# Patient Record
Sex: MALE | Race: WHITE | NOT HISPANIC OR LATINO | Employment: UNEMPLOYED | URBAN - METROPOLITAN AREA
[De-identification: names, ages, dates, MRNs, and addresses within clinical notes are randomized per-mention and may not be internally consistent; named-entity substitution may affect disease eponyms.]

---

## 2017-06-15 ENCOUNTER — OFFICE VISIT (OUTPATIENT)
Dept: FAMILY MEDICINE | Facility: CLINIC | Age: 52
End: 2017-06-15
Payer: COMMERCIAL

## 2017-06-15 VITALS
SYSTOLIC BLOOD PRESSURE: 136 MMHG | DIASTOLIC BLOOD PRESSURE: 82 MMHG | HEART RATE: 69 BPM | BODY MASS INDEX: 26.81 KG/M2 | RESPIRATION RATE: 14 BRPM | HEIGHT: 70 IN | WEIGHT: 187.25 LBS

## 2017-06-15 DIAGNOSIS — Z12.11 SCREENING FOR MALIGNANT NEOPLASM OF COLON: ICD-10-CM

## 2017-06-15 DIAGNOSIS — Z00.00 ROUTINE GENERAL MEDICAL EXAMINATION AT A HEALTH CARE FACILITY: Primary | ICD-10-CM

## 2017-06-15 DIAGNOSIS — Z12.5 SCREENING FOR PROSTATE CANCER: ICD-10-CM

## 2017-06-15 DIAGNOSIS — R03.0 ELEVATED BLOOD PRESSURE READING WITHOUT DIAGNOSIS OF HYPERTENSION: ICD-10-CM

## 2017-06-15 DIAGNOSIS — Z13.1 SCREENING FOR DIABETES MELLITUS: ICD-10-CM

## 2017-06-15 DIAGNOSIS — Z11.59 NEED FOR HEPATITIS C SCREENING TEST: ICD-10-CM

## 2017-06-15 LAB
GLUCOSE SERPL-MCNC: 148 MG/DL (ref 70–99)
PSA SERPL-ACNC: 0.55 UG/L (ref 0–4)

## 2017-06-15 PROCEDURE — G0103 PSA SCREENING: HCPCS | Performed by: FAMILY MEDICINE

## 2017-06-15 PROCEDURE — 36415 COLL VENOUS BLD VENIPUNCTURE: CPT | Performed by: FAMILY MEDICINE

## 2017-06-15 PROCEDURE — 82947 ASSAY GLUCOSE BLOOD QUANT: CPT | Performed by: FAMILY MEDICINE

## 2017-06-15 PROCEDURE — 86803 HEPATITIS C AB TEST: CPT | Performed by: FAMILY MEDICINE

## 2017-06-15 PROCEDURE — 99396 PREV VISIT EST AGE 40-64: CPT | Performed by: FAMILY MEDICINE

## 2017-06-15 NOTE — PATIENT INSTRUCTIONS
Go to [facility] now for your blood draw.  You will be contacted in 1-2 days for results of your lab tests.    Eat 5 cups of vegetables, fruits and whole grains per day.  Limit starchy food (white rice, white bread, white pasta, white potatoes) to less than a cup per meal.  Eat salmon, plain walnuts. Use olive oil for cooking or salad dressings.  Minimize sweets, junk food and fastfood.  Exercise: moderate intensity sustained for at least 30 mins per episode, goal of 150 mins per week at least    Blood pressure is iwthin range today but in prehypertensive range.  Monitor this closely.  If above 140/90 again, will need to start treatment.    Preventive Health Recommendations  Male Ages 50   64    Yearly exam:             See your health care provider every year in order to  o   Review health changes.   o   Discuss preventive care.    o   Review your medicines if your doctor has prescribed any.     Have a cholesterol test every 5 years, or more frequently if you are at risk for high cholesterol/heart disease.     Have a diabetes test (fasting glucose) every three years. If you are at risk for diabetes, you should have this test more often.     Have a colonoscopy at age 50, or have a yearly FIT test (stool test). These exams will check for colon cancer.      Talk with your health care provider about whether or not a prostate cancer screening test (PSA) is right for you.    You should be tested each year for STDs (sexually transmitted diseases), if you re at risk.     Shots: Get a flu shot each year. Get a tetanus shot every 10 years.     Nutrition:    Eat at least 5 servings of fruits and vegetables daily.     Eat whole-grain bread, whole-wheat pasta and brown rice instead of white grains and rice.     Talk to your provider about Calcium and Vitamin D.     Lifestyle    Exercise for at least 150 minutes a week (30 minutes a day, 5 days a week). This will help you control your weight and prevent disease.     Limit alcohol  to one drink per day.     No smoking.     Wear sunscreen to prevent skin cancer.     See your dentist every six months for an exam and cleaning.     See your eye doctor every 1 to 2 years.

## 2017-06-15 NOTE — LETTER
[facility]  [address] [address]  [phone]          Maggi 15, 2017    RE:  Osmin Yan                                                                                                                                                       [address] [address]            To whom it may concern:    Osmin Yan has been evaluated at the clinic today, Maggi 15, 2017.  His blood pressures today were measured to be 139/82 and 136/82 using appropriate sized upper arm cuff.  Based on this, he does not require antihypertensive medication.      Sincerely,        Mendoza Carter MD

## 2017-06-15 NOTE — NURSING NOTE
"Chief Complaint   Patient presents with     Physical       Initial /82  Pulse 69  Ht 5' 9.69\" (1.77 m)  Wt 187 lb 4 oz (84.9 kg)  BMI 27.11 kg/m2 Estimated body mass index is 27.11 kg/(m^2) as calculated from the following:    Height as of this encounter: 5' 9.69\" (1.77 m).    Weight as of this encounter: 187 lb 4 oz (84.9 kg).  Medication Reconciliation: complete   Hortencia Castrejon MA      "

## 2017-06-15 NOTE — PROGRESS NOTES
SUBJECTIVE:     CC: Osmin Yan is an 51 year old male who presents for preventative health visit.     Healthy Habits:    Do you get at least three servings of calcium containing foods daily (dairy, green leafy vegetables, etc.)? Yes 2-3    Amount of exercise or daily activities, outside of work: 2-4 hours per week , coaches daughters softball team, bike riding     Problems taking medications regularly not applicable    Medication side effects: not applicable     Have you had an eye exam in the past two years? Yes [facility] - around two years ago , wears glasses     Do you see a dentist twice per year? No- did have recent exam around two weeks ago     Do you have sleep apnea, excessive snoring or daytime drowsiness? Yes, excessive snoring , Mother has sleep apnea     * Discuss Blood pressure , was high at the dentist this past week 160/107 and 145/95 using a wrist BP machine twice, although did stop at [facility] and [facility] and blood pressure was 120/130- over 80/90. Denies chest pain, dyspnea, HA, BOV, dizziness or urinary changes. Not on BP meds. Patient's last BP in clinic WNL.     Was unable to have cavity's filled due to his blood pressure being high at the dentist     Today's PHQ-2 Score:   PHQ-2 ( 1999 [employer]) 6/15/2017 3/11/2014   Q1: Little interest or pleasure in doing things 0 0   Q2: Feeling down, depressed or hopeless 0 0   PHQ-2 Score 0 0       Abuse: Current or Past(Physical, Sexual or Emotional)- No  Do you feel safe in your environment - Yes    Social History   Substance Use Topics     Smoking status: Never Smoker     Smokeless tobacco: Never Used     Alcohol use No     The patient does not drink >3 drinks per day nor >7 drinks per week.    Last PSA:   PSA   Date Value Ref Range Status   03/12/2014 0.60 0 - 4 ug/L Final       Recent Labs   Lab Test  03/12/14   1018  03/01/12   0924   CHOL  151  121   HDL  35*  32*   LDL  86  67   TRIG  144  109   CHOLHDLRATIO  4.0  4.0       Reviewed  orders with patient. Reviewed health maintenance and updated orders accordingly - Yes    Reviewed and updated as needed this visit by clinical staff  Tobacco  Allergies  Meds  Problems  Med Hx  Surg Hx  Fam Hx  Soc Hx          Reviewed and updated as needed this visit by Provider  Allergies  Meds  Problems        Past Medical History:   Diagnosis Date     NO ACTIVE PROBLEMS       Past Surgical History:   Procedure Laterality Date     LAPAROSCOPIC CHOLECYSTECTOMY  3/21/2012    Procedure:LAPAROSCOPIC CHOLECYSTECTOMY; Laparoscopic Cholecystectomy; Surgeon:ROSALIE PEDROZA; Location:[facility]     SURGICAL HISTORY OF -   2005    none       ROS:  C: NEGATIVE for fever, chills, change in weight  I: NEGATIVE for worrisome rashes, moles or lesions  E: NEGATIVE for vision changes or irritation  ENT: NEGATIVE for ear, mouth and throat problems  R: NEGATIVE for significant cough or SOB  CV: NEGATIVE for chest pain, palpitations or peripheral edema  GI: NEGATIVE for nausea, abdominal pain, heartburn, or change in bowel habits   male: negative for dysuria, hematuria, decreased urinary stream, erectile dysfunction, urethral discharge  M: NEGATIVE for significant arthralgias or myalgia  N: NEGATIVE for weakness, dizziness or paresthesias  E: NEGATIVE for temperature intolerance, skin/hair changes  H: NEGATIVE for bleeding problems  P: NEGATIVE for changes in mood or affect    BP Readings from Last 3 Encounters:   06/15/17 136/82   05/24/14 123/82   03/11/14 140/85    Wt Readings from Last 3 Encounters:   06/15/17 187 lb 4 oz (84.9 kg)   03/11/14 185 lb 6.4 oz (84.1 kg)   03/21/12 176 lb (79.8 kg)                  Patient Active Problem List   Diagnosis     RUQ abdominal pain     Lipid disorder     Cholelithiasis     Calculus of gallbladder with other cholecystitis, without mention of obstruction     CARDIOVASCULAR SCREENING; LDL GOAL LESS THAN 160     HDL deficiency     Elevated ferritin     Past Surgical History:  "  Procedure Laterality Date     LAPAROSCOPIC CHOLECYSTECTOMY  3/21/2012    Procedure:LAPAROSCOPIC CHOLECYSTECTOMY; Laparoscopic Cholecystectomy; Surgeon:ROSALIE PEDROZA; Location:[facility] [facility]     SURGICAL HISTORY OF -   2005    none       Social History   Substance Use Topics     Smoking status: Never Smoker     Smokeless tobacco: Never Used     Alcohol use No     Family History   Problem Relation Age of Onset     Hypertension Mother      Sleep Apnea Mother      Lipids Father      Genitourinary Problems Father      prostate problems - getting biopsy to rule out cancer     Prostate Cancer Father      CEREBROVASCULAR DISEASE Maternal Grandfather      at late 50's yo     Alzheimer Disease Paternal Grandmother      at 70's yo     HEART DISEASE Paternal Grandfather      MI at 81 yo     Genitourinary Problems Paternal Grandfather      enlarged prostate on post-mortem autopsy     DIABETES Brother      type 1     Hypertension Maternal Aunt      Hypertension Maternal Uncle          No current outpatient prescriptions on file.     Allergies   Allergen Reactions     Nkda [No Known Drug Allergies]      Patient denies BM or urinary changes.  No fam hx of colon cancer before age 60.  No fam hx of prostate cancer.    OBJECTIVE:     /82  Pulse 69  Resp 14  Ht 5' 9.69\" (1.77 m)  Wt 187 lb 4 oz (84.9 kg)  BMI 27.11 kg/m2  EXAM:  GENERAL APPEARANCE: alert and no distress  EYES: pink conj, no icterus, PERRL, EOMI  HENT: ear canals and TM's normal, nose and mouth without ulcers or lesions, oropharynx clear and oral mucous membranes moist  NECK: no adenopathy, no asymmetry, masses, or scars and thyroid normal to palpation  RESP: lungs clear to auscultation - no rales, rhonchi or wheezes  CV: regular rates and rhythm, normal S1 S2, no S3 or S4, no murmur, click or rub, no peripheral edema and peripheral pulses strong  ABDOMEN: soft, nontender, no hepatosplenomegaly, no masses and bowel sounds normal  RECTAL:deferred  MS: no " musculoskeletal defects are noted and gait is age appropriate without ataxia  SKIN: no suspicious lesions or rashes  NEURO: Normal strength and tone, sensory exam grossly normal, mentation intact and speech normal    ASSESSMENT/PLAN:     Osmin was seen today for physical.    Diagnoses and all orders for this visit:    Routine general medical examination at a health care facility  Patient was advised on recommended screening and preventive health recommendations.  He verbalized understanding and agreed to the plans below.    Elevated blood pressure reading without diagnosis of hypertension  ADvised patient that BP is within goal but prehypertensive today.  No meds required.  Letter stating this issued for his dentist.    Need for hepatitis C screening test  -     **Hepatitis C Screen Reflex to RNA FUTURE anytime    Screening for prostate cancer  -     Prostate spec antigen screen    Screening for diabetes mellitus  -     GLUCOSE    Screening for malignant neoplasm of colon  -     GASTROENTEROLOGY ADULT REF PROCEDURE ONLY        COUNSELING:  Reviewed preventive health counseling, as reflected in patient instructions       Regular exercise       Healthy diet/nutrition       Vision screening       Hearing screening       Safe sex practices/STD prevention       Consider Hep C screening for patients born between 1945 and 1965       Colon cancer screening       Prostate cancer screening       Osteoporosis Prevention/Bone Health       The ASCVD Risk score (Bailey AMIRA Jr, et al., 2013) failed to calculate for the following reasons:    Cannot find a previous HDL lab    Cannot find a previous total cholesterol lab    BP Screening:   Last 3 BP Readings:    BP Readings from Last 3 Encounters:   06/15/17 136/82   05/24/14 123/82   03/11/14 140/85       The following was recommended to the patient:  Re-screen BP within a year and recommended lifestyle modifications     reports that he has never smoked. He has never used smokeless  "tobacco.    Estimated body mass index is 27.11 kg/(m^2) as calculated from the following:    Height as of this encounter: 5' 9.69\" (1.77 m).    Weight as of this encounter: 187 lb 4 oz (84.9 kg).   Weight management plan: Discussed healthy diet and exercise guidelines and patient will follow up in 12 months in clinic to re-evaluate.    Counseling Resources:  ATP IV Guidelines  Pooled Cohorts Equation Calculator  FRAX Risk Assessment  ICSI Preventive Guidelines  Dietary Guidelines for Americans, 2010  USDA's MyPlate  ASA Prophylaxis  Lung CA Screening    Mendoza Carter MD  [facility]  "

## 2017-06-15 NOTE — MR AVS SNAPSHOT
After Visit Summary   6/15/2017    Osmin Yan    MRN: [id number]           Patient Information     Date Of Birth          1965        Visit Information        Provider Department      6/15/2017 9:20 AM Mendoza Carter MD [facility]        Today's Diagnoses     Routine general medical examination at a health care facility    -  1    Elevated blood pressure reading without diagnosis of hypertension        Need for hepatitis C screening test        Screening for prostate cancer        Screening for diabetes mellitus          Care Instructions    Go to [facility] now for your blood draw.  You will be contacted in 1-2 days for results of your lab tests.    Eat 5 cups of vegetables, fruits and whole grains per day.  Limit starchy food (white rice, white bread, white pasta, white potatoes) to less than a cup per meal.  Eat salmon, plain walnuts. Use olive oil for cooking or salad dressings.  Minimize sweets, junk food and fastfood.  Exercise: moderate intensity sustained for at least 30 mins per episode, goal of 150 mins per week at least    Blood pressure is iwthin range today but in prehypertensive range.  Monitor this closely.  If above 140/90 again, will need to start treatment.    Preventive Health Recommendations  Male Ages 50 - 64    Yearly exam:             See your health care provider every year in order to  o   Review health changes.   o   Discuss preventive care.    o   Review your medicines if your doctor has prescribed any.     Have a cholesterol test every 5 years, or more frequently if you are at risk for high cholesterol/heart disease.     Have a diabetes test (fasting glucose) every three years. If you are at risk for diabetes, you should have this test more often.     Have a colonoscopy at age 50, or have a yearly FIT test (stool test). These exams will check for colon cancer.      Talk with your health care provider about whether or not a prostate cancer  "screening test (PSA) is right for you.    You should be tested each year for STDs (sexually transmitted diseases), if you re at risk.     Shots: Get a flu shot each year. Get a tetanus shot every 10 years.     Nutrition:    Eat at least 5 servings of fruits and vegetables daily.     Eat whole-grain bread, whole-wheat pasta and brown rice instead of white grains and rice.     Talk to your provider about Calcium and Vitamin D.     Lifestyle    Exercise for at least 150 minutes a week (30 minutes a day, 5 days a week). This will help you control your weight and prevent disease.     Limit alcohol to one drink per day.     No smoking.     Wear sunscreen to prevent skin cancer.     See your dentist every six months for an exam and cleaning.     See your eye doctor every 1 to 2 years.            Follow-ups after your visit        Follow-up notes from your care team     Return in about 1 year (around 6/15/2018), or if symptoms worsen or fail to improve.      Who to contact     If you have questions or need follow up information about today's clinic visit or your schedule please contact [facility] directly at [phone].  Normal or non-critical lab and imaging results will be communicated to you by MyChart, letter or phone within 4 business days after the clinic has received the results. If you do not hear from us within 7 days, please contact the clinic through [employer] or phone. If you have a critical or abnormal lab result, we will notify you by phone as soon as possible.  Submit refill requests through [employer] or call your pharmacy and they will forward the refill request to us. Please allow 3 business days for your refill to be completed.          Additional Information About Your Visit        revoPThart Information     [employer] lets you send messages to your doctor, view your test results, renew your prescriptions, schedule appointments and more. To sign up, go to [email or web address] . Click on \"Log in\" " "on the left side of the screen, which will take you to the Welcome page. Then click on \"Sign up Now\" on the right side of the page.     You will be asked to enter the access code listed below, as well as some personal information. Please follow the directions to create your username and password.     Your access code is: [email or web address]  Expires: 2017 10:14 AM     Your access code will  in 90 days. If you need help or a new code, please call your [facility] or [phone].        Care EveryWhere ID     This is your Care EveryWhere ID. This could be used by other organizations to access your [facility] medical records  [id number]        Your Vitals Were     Pulse Respirations Height BMI (Body Mass Index)          69 14 5' 9.69\" (1.77 m) 27.11 kg/m2         Blood Pressure from Last 3 Encounters:   06/15/17 136/82   14 123/82   14 140/85    Weight from Last 3 Encounters:   06/15/17 187 lb 4 oz (84.9 kg)   14 185 lb 6.4 oz (84.1 kg)   12 176 lb (79.8 kg)              We Performed the Following     **Hepatitis C Screen Reflex to RNA FUTURE anytime     GLUCOSE     Prostate spec antigen screen        Primary Care Provider    Physician No Ref-Primary       No address on file        Thank you!     Thank you for choosing [facility]  for your care. Our goal is always to provide you with excellent care. Hearing back from our patients is one way we can continue to improve our services. Please take a few minutes to complete the written survey that you may receive in the mail after your visit with us. Thank you!             Your Updated Medication List - Protect others around you: Learn how to safely use, store and throw away your medicines at www.disposemymeds.org.      Notice  As of 6/15/2017 10:14 AM    You have not been prescribed any medications.      "

## 2017-06-16 LAB — HCV AB SERPL QL IA: NORMAL

## 2017-10-16 ENCOUNTER — ANESTHESIA EVENT (OUTPATIENT)
Dept: GASTROENTEROLOGY | Facility: CLINIC | Age: 52
End: 2017-10-16
Payer: COMMERCIAL

## 2017-10-16 NOTE — ANESTHESIA PREPROCEDURE EVALUATION
Anesthesia Evaluation     . Pt has had prior anesthetic. Type: General           ROS/MED HX    ENT/Pulmonary:       Neurologic:       Cardiovascular:     (+) Dyslipidemia, ----. : . . . :. . Previous cardiac testing date:results:Stress Testdate:1-2011 results:TOMOGRAPHIC RESULTS:  On the stress images, perfusion defects are   seen in the inferior segments of the left ventricle.  On the rest   images, similar perfusion defects are seen.  Rotating images do   demonstrate diaphragmatic attenuation of the inferior wall; thus,   these fixed perfusion defects are attributable to attenuation   artifact.  Gated images demonstrated normal left ventricular systolic   contraction and wall thickening.  The ejection fraction post-stress   was 60%. ECG reviewed date: results:NSR date: results:          METS/Exercise Tolerance:     Hematologic:         Musculoskeletal:         GI/Hepatic:         Renal/Genitourinary:         Endo:         Psychiatric:         Infectious Disease:         Malignancy:         Other:                     Physical Exam  Normal systems: cardiovascular, pulmonary and dental    Airway   Mallampati: I  TM distance: >3 FB  Neck ROM: full    Dental     Cardiovascular   Rhythm and rate: regular and normal      Pulmonary    breath sounds clear to auscultation                    Anesthesia Plan      History & Physical Review  History and physical reviewed and following examination; no interval change.    ASA Status:  2 .    NPO Status:  > 6 hours    Plan for MAC Reason for MAC:  Deep or markedly invasive procedure (G8)         Postoperative Care      Consents  Anesthetic plan, risks, benefits and alternatives discussed with:  Patient..                          .

## 2017-10-20 ENCOUNTER — ANESTHESIA (OUTPATIENT)
Dept: GASTROENTEROLOGY | Facility: CLINIC | Age: 52
End: 2017-10-20
Payer: COMMERCIAL

## 2017-10-27 ENCOUNTER — SURGERY (OUTPATIENT)
Age: 52
End: 2017-10-27

## 2017-10-27 ENCOUNTER — HOSPITAL ENCOUNTER (OUTPATIENT)
Facility: CLINIC | Age: 52
Discharge: HOME | End: 2017-10-27
Attending: SURGERY | Admitting: SURGERY
Payer: COMMERCIAL

## 2017-10-27 VITALS
SYSTOLIC BLOOD PRESSURE: 115 MMHG | TEMPERATURE: 97.8 F | HEIGHT: 70 IN | BODY MASS INDEX: 24.48 KG/M2 | OXYGEN SATURATION: 96 % | RESPIRATION RATE: 16 BRPM | DIASTOLIC BLOOD PRESSURE: 73 MMHG | WEIGHT: 171 LBS

## 2017-10-27 LAB — COLONOSCOPY: NORMAL

## 2017-10-27 PROCEDURE — 45378 DIAGNOSTIC COLONOSCOPY: CPT | Performed by: SURGERY

## 2017-10-27 PROCEDURE — G0121 COLON CA SCRN NOT HI RSK IND: HCPCS | Performed by: SURGERY

## 2017-10-27 PROCEDURE — 25000125 ZZHC RX 250: Performed by: SURGERY

## 2017-10-27 PROCEDURE — 25000128 H RX IP 250 OP 636: Performed by: SURGERY

## 2017-10-27 PROCEDURE — 37000008 ZZH ANESTHESIA TECHNICAL FEE, 1ST 30 MIN: Performed by: SURGERY

## 2017-10-27 PROCEDURE — 25000128 H RX IP 250 OP 636: Performed by: NURSE ANESTHETIST, CERTIFIED REGISTERED

## 2017-10-27 RX ORDER — ONDANSETRON 2 MG/ML
4 INJECTION INTRAMUSCULAR; INTRAVENOUS
Status: DISCONTINUED | OUTPATIENT
Start: 2017-10-27 | End: 2017-10-27 | Stop reason: HOSPADM

## 2017-10-27 RX ORDER — SODIUM CHLORIDE, SODIUM LACTATE, POTASSIUM CHLORIDE, CALCIUM CHLORIDE 600; 310; 30; 20 MG/100ML; MG/100ML; MG/100ML; MG/100ML
INJECTION, SOLUTION INTRAVENOUS CONTINUOUS
Status: DISCONTINUED | OUTPATIENT
Start: 2017-10-27 | End: 2017-10-27 | Stop reason: HOSPADM

## 2017-10-27 RX ORDER — PROPOFOL 10 MG/ML
INJECTION, EMULSION INTRAVENOUS PRN
Status: DISCONTINUED | OUTPATIENT
Start: 2017-10-27 | End: 2017-10-27

## 2017-10-27 RX ORDER — LIDOCAINE 40 MG/G
CREAM TOPICAL
Status: DISCONTINUED | OUTPATIENT
Start: 2017-10-27 | End: 2017-10-27 | Stop reason: HOSPADM

## 2017-10-27 RX ADMIN — LIDOCAINE HYDROCHLORIDE 1 ML: 10 INJECTION, SOLUTION EPIDURAL; INFILTRATION; INTRACAUDAL; PERINEURAL at 09:15

## 2017-10-27 RX ADMIN — PROPOFOL 150 MG: 10 INJECTION, EMULSION INTRAVENOUS at 10:10

## 2017-10-27 RX ADMIN — SODIUM CHLORIDE, POTASSIUM CHLORIDE, SODIUM LACTATE AND CALCIUM CHLORIDE: 600; 310; 30; 20 INJECTION, SOLUTION INTRAVENOUS at 09:15

## 2017-10-27 RX ADMIN — PROPOFOL 150 MG: 10 INJECTION, EMULSION INTRAVENOUS at 10:09

## 2017-10-27 NOTE — ANESTHESIA POSTPROCEDURE EVALUATION
Patient: Osmin Yan    Procedure(s):  colonoscopy - Wound Class: II-Clean Contaminated    Diagnosis:screening  Diagnosis Additional Information: No value filed.    Anesthesia Type:  MAC    Note:  Anesthesia Post Evaluation    Patient location during evaluation: Bedside  Patient participation: Able to fully participate in evaluation  Level of consciousness: awake and alert  Pain management: adequate  Airway patency: patent  Cardiovascular status: acceptable  Respiratory status: acceptable  Hydration status: acceptable  PONV: none     Anesthetic complications: None          Last vitals:  Vitals:    10/27/17 0904   BP: (!) 153/98   Resp: 18   Temp: 36.6  C (97.8  F)   SpO2: 97%         Electronically Signed By: STAR Maria CRNA  October 27, 2017  10:27 AM

## 2017-11-22 ENCOUNTER — ALLIED HEALTH/NURSE VISIT (OUTPATIENT)
Dept: FAMILY MEDICINE | Facility: CLINIC | Age: 52
End: 2017-11-22
Payer: COMMERCIAL

## 2017-11-22 DIAGNOSIS — Z00.00 ROUTINE GENERAL MEDICAL EXAMINATION AT A HEALTH CARE FACILITY: Primary | ICD-10-CM

## 2017-11-22 PROCEDURE — 99207 ZZC NO CHARGE NURSE ONLY: CPT

## 2017-11-22 NOTE — NURSING NOTE
Patient state he needs to have someone call his dentist stating he had a physical in June 2017. I did call to give them a verbal that he completed a physical in 6/2017    Brittany Clay RN

## 2017-11-22 NOTE — MR AVS SNAPSHOT
"              After Visit Summary   11/22/2017    Osmin Yan    MRN: [id number]           Patient Information     Date Of Birth          1965        Visit Information        Provider Department      11/22/2017 3:00 PM DAVIDSON KIMBALL FP/IM RN [facility]        Today's Diagnoses     Routine general medical examination at a health care facility    -  1       Follow-ups after your visit        Your next 10 appointments already scheduled     Nov 22, 2017  3:00 PM CST   Nurse Only with DAVIDSON KIMBALL FP/IM RN   [facility] ([facility])    [address] [place] [address] [phone]              Who to contact     If you have questions or need follow up information about today's clinic visit or your schedule please contact [facility] directly at [phone].  Normal or non-critical lab and imaging results will be communicated to you by [employer], letter or phone within 4 business days after the clinic has received the results. If you do not hear from us within 7 days, please contact the clinic through YES[employer]TAPhart or phone. If you have a critical or abnormal lab result, we will notify you by phone as soon as possible.  Submit refill requests through [employer] or call your pharmacy and they will forward the refill request to us. Please allow 3 business days for your refill to be completed.          Additional Information About Your Visit        MyChart Information     [employer] lets you send messages to your doctor, view your test results, renew your prescriptions, schedule appointments and more. To sign up, go to [email or web address] . Click on \"Log in\" on the left side of the screen, which will take you to the Welcome page. Then click on \"Sign up Now\" on the right side of the page.     You will be asked to enter the access code listed below, as well as some personal information. Please follow the directions to create your username and password.     Your access code " is: [email or web address]  Expires: 2018  2:59 PM     Your access code will  in 90 days. If you need help or a new code, please call your [facility] clinic or [phone].        Care EveryWhere ID     This is your Care EveryWhere ID. This could be used by other organizations to access your [facility] medical records  [id number]         Blood Pressure from Last 3 Encounters:   10/27/17 115/73   06/15/17 136/82   14 123/82    Weight from Last 3 Encounters:   10/27/17 171 lb (77.6 kg)   06/15/17 187 lb 4 oz (84.9 kg)   14 185 lb 6.4 oz (84.1 kg)              Today, you had the following     No orders found for display       Primary Care Provider Office Phone # Fax #    [facility] Antonio Carter [phone] [address] [address]        Equal Access to Services     NARENDRA OLSEN : Hadii aad ku hadasho Soomaali, waaxda luqadaha, qaybta kaalmada adeegyada, waxay idiin haygomezn kavya smith . So [facility] [phone].    ATENCIÓN: Si habla español, tiene a camilo disposición servicios gratuitos de asistencia lingüística. Llame al [phone].    We comply with applicable federal civil rights laws and [place] laws. We do not discriminate on the basis of race, color, national origin, age, disability, sex, sexual orientation, or gender identity.            Thank you!     Thank you for choosing [facility]  for your care. Our goal is always to provide you with excellent care. Hearing back from our patients is one way we can continue to improve our services. Please take a few minutes to complete the written survey that you may receive in the mail after your visit with us. Thank you!             Your Updated Medication List - Protect others around you: Learn how to safely use, store and throw away your medicines at www.disposemymeds.org.      Notice  As of 2017  2:59 PM    You have not been prescribed any medications.

## 2019-10-11 ENCOUNTER — APPOINTMENT (OUTPATIENT)
Dept: GENERAL RADIOLOGY | Facility: CLINIC | Age: 54
End: 2019-10-11
Attending: EMERGENCY MEDICINE
Payer: COMMERCIAL

## 2019-10-11 ENCOUNTER — HOSPITAL ENCOUNTER (EMERGENCY)
Facility: CLINIC | Age: 54
Discharge: HOME | End: 2019-10-11
Attending: EMERGENCY MEDICINE | Admitting: EMERGENCY MEDICINE
Payer: COMMERCIAL

## 2019-10-11 VITALS
OXYGEN SATURATION: 94 % | DIASTOLIC BLOOD PRESSURE: 94 MMHG | HEART RATE: 92 BPM | SYSTOLIC BLOOD PRESSURE: 144 MMHG | HEIGHT: 69 IN | TEMPERATURE: 99 F | WEIGHT: 174 LBS | RESPIRATION RATE: 18 BRPM | BODY MASS INDEX: 25.77 KG/M2

## 2019-10-11 DIAGNOSIS — J98.01 BRONCHOSPASM: ICD-10-CM

## 2019-10-11 DIAGNOSIS — J06.9 UPPER RESPIRATORY TRACT INFECTION, UNSPECIFIED TYPE: ICD-10-CM

## 2019-10-11 LAB
ALBUMIN SERPL-MCNC: 4.3 G/DL (ref 3.4–5)
ALP SERPL-CCNC: 81 U/L (ref 40–150)
ALT SERPL W P-5'-P-CCNC: 69 U/L (ref 0–70)
ANION GAP SERPL CALCULATED.3IONS-SCNC: 8 MMOL/L (ref 3–14)
AST SERPL W P-5'-P-CCNC: 36 U/L (ref 0–45)
BASOPHILS # BLD AUTO: 0 10E9/L (ref 0–0.2)
BASOPHILS NFR BLD AUTO: 0.4 %
BILIRUB SERPL-MCNC: 1.3 MG/DL (ref 0.2–1.3)
BUN SERPL-MCNC: 18 MG/DL (ref 7–30)
CALCIUM SERPL-MCNC: 9.1 MG/DL (ref 8.5–10.1)
CHLORIDE SERPL-SCNC: 106 MMOL/L (ref 94–109)
CO2 SERPL-SCNC: 26 MMOL/L (ref 20–32)
CREAT SERPL-MCNC: 1.18 MG/DL (ref 0.66–1.25)
DIFFERENTIAL METHOD BLD: NORMAL
EOSINOPHIL # BLD AUTO: 0.5 10E9/L (ref 0–0.7)
EOSINOPHIL NFR BLD AUTO: 7.5 %
ERYTHROCYTE [DISTWIDTH] IN BLOOD BY AUTOMATED COUNT: 11.7 % (ref 10–15)
GFR SERPL CREATININE-BSD FRML MDRD: 69 ML/MIN/{1.73_M2}
GLUCOSE SERPL-MCNC: 128 MG/DL (ref 70–99)
HCT VFR BLD AUTO: 50.1 % (ref 40–53)
HGB BLD-MCNC: 17.3 G/DL (ref 13.3–17.7)
IMM GRANULOCYTES # BLD: 0 10E9/L (ref 0–0.4)
IMM GRANULOCYTES NFR BLD: 0.3 %
LYMPHOCYTES # BLD AUTO: 1.4 10E9/L (ref 0.8–5.3)
LYMPHOCYTES NFR BLD AUTO: 19.5 %
MCH RBC QN AUTO: 31 PG (ref 26.5–33)
MCHC RBC AUTO-ENTMCNC: 34.5 G/DL (ref 31.5–36.5)
MCV RBC AUTO: 90 FL (ref 78–100)
MONOCYTES # BLD AUTO: 0.7 10E9/L (ref 0–1.3)
MONOCYTES NFR BLD AUTO: 9.8 %
NEUTROPHILS # BLD AUTO: 4.4 10E9/L (ref 1.6–8.3)
NEUTROPHILS NFR BLD AUTO: 62.5 %
NRBC # BLD AUTO: 0 10*3/UL
NRBC BLD AUTO-RTO: 0 /100
NT-PROBNP SERPL-MCNC: 45 PG/ML (ref 0–900)
PLATELET # BLD AUTO: 164 10E9/L (ref 150–450)
POTASSIUM SERPL-SCNC: 4 MMOL/L (ref 3.4–5.3)
PROT SERPL-MCNC: 7.4 G/DL (ref 6.8–8.8)
RBC # BLD AUTO: 5.58 10E12/L (ref 4.4–5.9)
SODIUM SERPL-SCNC: 140 MMOL/L (ref 133–144)
TROPONIN I SERPL-MCNC: <0.015 UG/L (ref 0–0.04)
WBC # BLD AUTO: 7 10E9/L (ref 4–11)

## 2019-10-11 PROCEDURE — 83880 ASSAY OF NATRIURETIC PEPTIDE: CPT | Performed by: EMERGENCY MEDICINE

## 2019-10-11 PROCEDURE — 84484 ASSAY OF TROPONIN QUANT: CPT | Performed by: EMERGENCY MEDICINE

## 2019-10-11 PROCEDURE — 25000125 ZZHC RX 250: Performed by: EMERGENCY MEDICINE

## 2019-10-11 PROCEDURE — 80053 COMPREHEN METABOLIC PANEL: CPT | Performed by: EMERGENCY MEDICINE

## 2019-10-11 PROCEDURE — 25000131 ZZH RX MED GY IP 250 OP 636 PS 637: Performed by: EMERGENCY MEDICINE

## 2019-10-11 PROCEDURE — 71046 X-RAY EXAM CHEST 2 VIEWS: CPT

## 2019-10-11 PROCEDURE — 99285 EMERGENCY DEPT VISIT HI MDM: CPT | Mod: 25 | Performed by: EMERGENCY MEDICINE

## 2019-10-11 PROCEDURE — 94640 AIRWAY INHALATION TREATMENT: CPT | Mod: 76

## 2019-10-11 PROCEDURE — 93010 ELECTROCARDIOGRAM REPORT: CPT | Mod: Z6 | Performed by: EMERGENCY MEDICINE

## 2019-10-11 PROCEDURE — 94640 AIRWAY INHALATION TREATMENT: CPT

## 2019-10-11 PROCEDURE — 93005 ELECTROCARDIOGRAM TRACING: CPT | Performed by: EMERGENCY MEDICINE

## 2019-10-11 PROCEDURE — 40000275 ZZH STATISTIC RCP TIME EA 10 MIN

## 2019-10-11 PROCEDURE — 85025 COMPLETE CBC W/AUTO DIFF WBC: CPT | Performed by: EMERGENCY MEDICINE

## 2019-10-11 RX ORDER — PREDNISONE 20 MG/1
60 TABLET ORAL ONCE
Status: COMPLETED | OUTPATIENT
Start: 2019-10-11 | End: 2019-10-11

## 2019-10-11 RX ORDER — ALBUTEROL SULFATE 90 UG/1
2 AEROSOL, METERED RESPIRATORY (INHALATION) EVERY 6 HOURS
Qty: 1 INHALER | Refills: 0 | Status: SHIPPED | OUTPATIENT
Start: 2019-10-11 | End: 2019-11-11

## 2019-10-11 RX ORDER — IPRATROPIUM BROMIDE AND ALBUTEROL SULFATE 2.5; .5 MG/3ML; MG/3ML
3 SOLUTION RESPIRATORY (INHALATION) ONCE
Status: COMPLETED | OUTPATIENT
Start: 2019-10-11 | End: 2019-10-11

## 2019-10-11 RX ORDER — PREDNISONE 20 MG/1
40 TABLET ORAL DAILY
Qty: 14 TABLET | Refills: 0 | Status: SHIPPED | OUTPATIENT
Start: 2019-10-11 | End: 2019-10-21

## 2019-10-11 RX ADMIN — PREDNISONE 60 MG: 20 TABLET ORAL at 21:03

## 2019-10-11 RX ADMIN — IPRATROPIUM BROMIDE AND ALBUTEROL SULFATE 3 ML: .5; 3 SOLUTION RESPIRATORY (INHALATION) at 19:42

## 2019-10-11 RX ADMIN — IPRATROPIUM BROMIDE AND ALBUTEROL SULFATE 3 ML: .5; 3 SOLUTION RESPIRATORY (INHALATION) at 17:55

## 2019-10-11 ASSESSMENT — MIFFLIN-ST. JEOR: SCORE: 1614.64

## 2019-10-11 NOTE — ED NOTES
Pt states increased work of breathing for the past year, every time he gets a cold breathing gets significantly worse and wheezes.  Got a cold 3 days ago, having hard time breathing, audible wheezing. Pt has productive cough, clear/white color, occasional caramel color. Denies hx asthma, non smoker. Pt sitting in tripod position.

## 2019-10-11 NOTE — ED AVS SNAPSHOT
[facility] Emergency Department  [address] [address]  Phone:  [phone]  Fax:  [phone]                                    Osmin Yan   MRN: [id number]    Department:  [facility] Emergency Department   Date of Visit:  10/11/2019           After Visit Summary Signature Page    I have received my discharge instructions, and my questions have been answered. I have discussed any challenges I see with this plan with the nurse or doctor.    ..........................................................................................................................................  Patient/Patient Representative Signature      ..........................................................................................................................................  Patient Representative Print Name and Relationship to Patient    ..................................................               ................................................  Date                                   Time    ..........................................................................................................................................  Reviewed by Signature/Title    ...................................................              ..............................................  Date                                               Time          22EPIC Rev 08/18

## 2019-10-11 NOTE — ED PROVIDER NOTES
History     Chief Complaint   Patient presents with     Shortness of Breath     has been having breathing problems for almost a year, caught a cold and feels like it's much worse     HPI  Osmin Yan is a 53 year old male who presents with shortness of air and wheezing, progressively worse, walking the dog gets out of breath.  Denies chest pain orthopnea leg pain swelling, denies history of a risk factor for DVT/pulmonary embolism.  He is a non-smoker.  Minimal alcohol.  Symptoms began about a year ago closure to cat.  He is developed URI symptoms over the past 3 days which is exacerbated his dyspnea.  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 Please review for accuracy       RUQ abdominal pain 03/01/2012     Priority: Medium     Lipid disorder 03/01/2012     Priority: Medium        Past Medical History:    Past Medical History:   Diagnosis Date     NO ACTIVE PROBLEMS        Past Surgical History:    Past Surgical History:   Procedure Laterality Date     COLONOSCOPY N/A 10/27/2017    Procedure: COLONOSCOPY;  colonoscopy;  Surgeon: Robert Monzon MD;  Location: [facility]     LAPAROSCOPIC CHOLECYSTECTOMY  3/21/2012    Procedure:LAPAROSCOPIC CHOLECYSTECTOMY; Laparoscopic Cholecystectomy; Surgeon:ROSALIE PEDROZA; Location:[facility]     SURGICAL HISTORY OF -   2005    none  "      Family History:    Family History   Problem Relation Age of Onset     Hypertension Mother      Sleep Apnea Mother      Lipids Father      Genitourinary Problems Father         prostate problems - getting biopsy to rule out cancer     Prostate Cancer Father      Cerebrovascular Disease Maternal Grandfather         at late 50's yo     Alzheimer Disease Paternal Grandmother         at 70's yo     Heart Disease Paternal Grandfather         MI at 79 yo     Genitourinary Problems Paternal Grandfather         enlarged prostate on post-mortem autopsy     Diabetes Brother         type 1     Hypertension Maternal Aunt      Hypertension Maternal Uncle        Social History:  Marital Status:   [2]  Social History     Tobacco Use     Smoking status: Never Smoker     Smokeless tobacco: Never Used   Substance Use Topics     Alcohol use: No     Drug use: No        Medications:    albuterol (PROAIR HFA/PROVENTIL HFA/VENTOLIN HFA) 108 (90 Base) MCG/ACT inhaler  predniSONE (DELTASONE) 20 MG tablet          Review of Systems  All other systems reviewed and are negative.    Physical Exam   BP: (!) 154/90  Pulse: 100  Temp: 98.2  F (36.8  C)  Resp: 20  Height: 175.3 cm (5' 9\")  Weight: 78.9 kg (174 lb)  SpO2: 94 %      Physical Exam  Nontoxic appearing no respiratory distress alert and oriented ×3  Head atraumatic normocephalic  Lungs diminished breath sounds increased expiratory phase, diffuse expiratory wheeze  Heart regular no murmur  Abdomen soft nontender bowel sounds positive no masses or HSM  Strength and sensation grossly intact throughout the extremities, gait and station normal  Speech is fluent, good eye contact, thought processes are rational  Lower extremities without swelling, redness or tenderness  Pedal pulses symmetrical and strong    ED Course        Procedures  ECG: Normal rate and rhythm, axis and intervals within normal limits, no acute ST or T wave changes. Read by Dr. Nakul Castrejon             Results " for orders placed or performed during the hospital encounter of 10/11/19   XR Chest 2 Views    Narrative    CHEST TWO VIEWS    10/11/2019 7:02 PM     HISTORY: Dyspnea    COMPARISON: None.      Impression    IMPRESSION: No acute cardiopulmonary disease.    LYSSA GUNN MD   CBC with platelets differential   Result Value Ref Range    WBC 7.0 4.0 - 11.0 10e9/L    RBC Count 5.58 4.4 - 5.9 10e12/L    Hemoglobin 17.3 13.3 - 17.7 g/dL    Hematocrit 50.1 40.0 - 53.0 %    MCV 90 78 - 100 fl    MCH 31.0 26.5 - 33.0 pg    MCHC 34.5 31.5 - 36.5 g/dL    RDW 11.7 10.0 - 15.0 %    Platelet Count 164 150 - 450 10e9/L    Diff Method Automated Method     % Neutrophils 62.5 %    % Lymphocytes 19.5 %    % Monocytes 9.8 %    % Eosinophils 7.5 %    % Basophils 0.4 %    % Immature Granulocytes 0.3 %    Nucleated RBCs 0 0 /100    Absolute Neutrophil 4.4 1.6 - 8.3 10e9/L    Absolute Lymphocytes 1.4 0.8 - 5.3 10e9/L    Absolute Monocytes 0.7 0.0 - 1.3 10e9/L    Absolute Eosinophils 0.5 0.0 - 0.7 10e9/L    Absolute Basophils 0.0 0.0 - 0.2 10e9/L    Abs Immature Granulocytes 0.0 0 - 0.4 10e9/L    Absolute Nucleated RBC 0.0    Comprehensive metabolic panel   Result Value Ref Range    Sodium 140 133 - 144 mmol/L    Potassium 4.0 3.4 - 5.3 mmol/L    Chloride 106 94 - 109 mmol/L    Carbon Dioxide 26 20 - 32 mmol/L    Anion Gap 8 3 - 14 mmol/L    Glucose 128 (H) 70 - 99 mg/dL    Urea Nitrogen 18 7 - 30 mg/dL    Creatinine 1.18 0.66 - 1.25 mg/dL    GFR Estimate 69 >60 mL/min/[1.73_m2]    GFR Estimate If Black 79 >60 mL/min/[1.73_m2]    Calcium 9.1 8.5 - 10.1 mg/dL    Bilirubin Total 1.3 0.2 - 1.3 mg/dL    Albumin 4.3 3.4 - 5.0 g/dL    Protein Total 7.4 6.8 - 8.8 g/dL    Alkaline Phosphatase 81 40 - 150 U/L    ALT 69 0 - 70 U/L    AST 36 0 - 45 U/L   Troponin I   Result Value Ref Range    Troponin I ES <0.015 0.000 - 0.045 ug/L   Nt probnp inpatient (BNP)   Result Value Ref Range    N-Terminal Pro BNP Inpatient 45 0 - 900 pg/mL         Critical  Care time:  none               Results for orders placed or performed during the hospital encounter of 10/11/19 (from the past 24 hour(s))   CBC with platelets differential   Result Value Ref Range    WBC 7.0 4.0 - 11.0 10e9/L    RBC Count 5.58 4.4 - 5.9 10e12/L    Hemoglobin 17.3 13.3 - 17.7 g/dL    Hematocrit 50.1 40.0 - 53.0 %    MCV 90 78 - 100 fl    MCH 31.0 26.5 - 33.0 pg    MCHC 34.5 31.5 - 36.5 g/dL    RDW 11.7 10.0 - 15.0 %    Platelet Count 164 150 - 450 10e9/L    Diff Method Automated Method     % Neutrophils 62.5 %    % Lymphocytes 19.5 %    % Monocytes 9.8 %    % Eosinophils 7.5 %    % Basophils 0.4 %    % Immature Granulocytes 0.3 %    Nucleated RBCs 0 0 /100    Absolute Neutrophil 4.4 1.6 - 8.3 10e9/L    Absolute Lymphocytes 1.4 0.8 - 5.3 10e9/L    Absolute Monocytes 0.7 0.0 - 1.3 10e9/L    Absolute Eosinophils 0.5 0.0 - 0.7 10e9/L    Absolute Basophils 0.0 0.0 - 0.2 10e9/L    Abs Immature Granulocytes 0.0 0 - 0.4 10e9/L    Absolute Nucleated RBC 0.0    Comprehensive metabolic panel   Result Value Ref Range    Sodium 140 133 - 144 mmol/L    Potassium 4.0 3.4 - 5.3 mmol/L    Chloride 106 94 - 109 mmol/L    Carbon Dioxide 26 20 - 32 mmol/L    Anion Gap 8 3 - 14 mmol/L    Glucose 128 (H) 70 - 99 mg/dL    Urea Nitrogen 18 7 - 30 mg/dL    Creatinine 1.18 0.66 - 1.25 mg/dL    GFR Estimate 69 >60 mL/min/[1.73_m2]    GFR Estimate If Black 79 >60 mL/min/[1.73_m2]    Calcium 9.1 8.5 - 10.1 mg/dL    Bilirubin Total 1.3 0.2 - 1.3 mg/dL    Albumin 4.3 3.4 - 5.0 g/dL    Protein Total 7.4 6.8 - 8.8 g/dL    Alkaline Phosphatase 81 40 - 150 U/L    ALT 69 0 - 70 U/L    AST 36 0 - 45 U/L   Troponin I   Result Value Ref Range    Troponin I ES <0.015 0.000 - 0.045 ug/L   Nt probnp inpatient (BNP)   Result Value Ref Range    N-Terminal Pro BNP Inpatient 45 0 - 900 pg/mL   XR Chest 2 Views    Narrative    CHEST TWO VIEWS    10/11/2019 7:02 PM     HISTORY: Dyspnea    COMPARISON: None.      Impression    IMPRESSION: No acute  cardiopulmonary disease.    LYSSA GUNN MD       Medications   ipratropium - albuterol 0.5 mg/2.5 mg/3 mL (DUONEB) neb solution 3 mL (3 mLs Nebulization Given 10/11/19 1755)   ipratropium - albuterol 0.5 mg/2.5 mg/3 mL (DUONEB) neb solution 3 mL (3 mLs Nebulization Given 10/11/19 1942)   predniSONE (DELTASONE) tablet 60 mg (60 mg Oral Given 10/11/19 2103)       Assessments & Plan (with Medical Decision Making)  Short of air wheezing cough details per HPI.  Broad differential considered including but not limited to congestive heart failure, pneumonia, bronchitis, bronchospasm versus other.  ECG normal, labs normal chest x-ray unremarkable, responded well to DuoNeb.  Findings consistent with bronchospasm URI.  Treat with prednisone and albuterol follow-up primary care return criteria reviewed     I have reviewed the nursing notes.    I have reviewed the findings, diagnosis, plan and need for follow up with the patient.          Discharge Medication List as of 10/11/2019  8:58 PM      START taking these medications    Details   albuterol (PROAIR HFA/PROVENTIL HFA/VENTOLIN HFA) 108 (90 Base) MCG/ACT inhaler Inhale 2 puffs into the lungs every 6 hours for 10 days, Disp-1 Inhaler, R-0, E-Prescribe      predniSONE (DELTASONE) 20 MG tablet Take 2 tablets (40 mg) by mouth daily for 7 days, Disp-14 tablet, R-0, E-Prescribe             Final diagnoses:   Upper respiratory tract infection, unspecified type   Bronchospasm       10/11/2019   [facility] EMERGENCY DEPARTMENT     Nakul Castrejon MD  10/11/19 5220

## 2019-10-21 ENCOUNTER — OFFICE VISIT (OUTPATIENT)
Dept: FAMILY MEDICINE | Facility: CLINIC | Age: 54
End: 2019-10-21
Payer: COMMERCIAL

## 2019-10-21 VITALS
RESPIRATION RATE: 18 BRPM | DIASTOLIC BLOOD PRESSURE: 84 MMHG | HEIGHT: 69 IN | TEMPERATURE: 97 F | OXYGEN SATURATION: 98 % | SYSTOLIC BLOOD PRESSURE: 136 MMHG | BODY MASS INDEX: 25.86 KG/M2 | HEART RATE: 74 BPM | WEIGHT: 174.6 LBS

## 2019-10-21 DIAGNOSIS — J98.01 ACUTE BRONCHOSPASM: Primary | ICD-10-CM

## 2019-10-21 DIAGNOSIS — R09.82 ALLERGIC RHINITIS WITH POSTNASAL DRIP: ICD-10-CM

## 2019-10-21 DIAGNOSIS — Z71.89 ADVANCED DIRECTIVES, COUNSELING/DISCUSSION: ICD-10-CM

## 2019-10-21 DIAGNOSIS — J30.9 ALLERGIC RHINITIS WITH POSTNASAL DRIP: ICD-10-CM

## 2019-10-21 DIAGNOSIS — J30.81 ALLERGY TO CATS: ICD-10-CM

## 2019-10-21 DIAGNOSIS — R73.9 ELEVATED BLOOD SUGAR LEVEL: ICD-10-CM

## 2019-10-21 DIAGNOSIS — Z23 NEED FOR PROPHYLACTIC VACCINATION AND INOCULATION AGAINST INFLUENZA: ICD-10-CM

## 2019-10-21 DIAGNOSIS — E78.6 HDL DEFICIENCY: ICD-10-CM

## 2019-10-21 PROCEDURE — 90686 IIV4 VACC NO PRSV 0.5 ML IM: CPT | Performed by: FAMILY MEDICINE

## 2019-10-21 PROCEDURE — 99214 OFFICE O/P EST MOD 30 MIN: CPT | Mod: 25 | Performed by: FAMILY MEDICINE

## 2019-10-21 PROCEDURE — 90471 IMMUNIZATION ADMIN: CPT | Performed by: FAMILY MEDICINE

## 2019-10-21 RX ORDER — FLUTICASONE PROPIONATE 50 MCG
1 SPRAY, SUSPENSION (ML) NASAL DAILY
Qty: 16 G | Refills: 11 | Status: SHIPPED | OUTPATIENT
Start: 2019-10-21 | End: 2022-03-28

## 2019-10-21 ASSESSMENT — MIFFLIN-ST. JEOR: SCORE: 1616.32

## 2019-10-21 NOTE — PROGRESS NOTES
"Subjective     Osmin Yan is a 54 year old male who presents to clinic today for the following health issues:  Chief Complaint   Patient presents with     ER F/U     Pt here for post e/r f/u.     Flu Shot     Imm/Inj     Flu Shot       HPI   ED/UC Followup:    Facility:  [facility]  Date of visit: 10/11/19  Reason for visit: sob  Current Status: patient feels a lot better but not completely back to normal       Patient reports allergy to cats. Since last winter, after being in a house with cats, he has had intermittent bouts of wheezing and shortness of breath, with the worst on day of UC visit.    After first day of prednisone, patient did not feel need to use albuterol.    He reports almost daily \"postnasal drip\" (actual term patient called his symptom) with no specific trigger.  No home remedies tried yet.  No hx of allergic rhinitis dx.    Patient requests repeat lipid panel done - last one had low HDL 5 years ago.  He also had high random glucose at the ER. No hx of diabetes. He has hx of high glucose before too.      Patient Active Problem List   Diagnosis     Lipid disorder     Cholelithiasis     Calculus of gallbladder with other cholecystitis, without mention of obstruction     CARDIOVASCULAR SCREENING; LDL GOAL LESS THAN 160     HDL deficiency     Elevated ferritin     Allergy to cats     Past Surgical History:   Procedure Laterality Date     COLONOSCOPY N/A 10/27/2017    Procedure: COLONOSCOPY;  colonoscopy;  Surgeon: Robert Monzon MD;  Location: [facility]     LAPAROSCOPIC CHOLECYSTECTOMY  3/21/2012    Procedure:LAPAROSCOPIC CHOLECYSTECTOMY; Laparoscopic Cholecystectomy; Surgeon:ROSALIE PEDROZA; Location:[facility]     SURGICAL HISTORY OF -   2005    none       Social History     Tobacco Use     Smoking status: Never Smoker     Smokeless tobacco: Never Used   Substance Use Topics     Alcohol use: No     Family History   Problem Relation Age of Onset     Hypertension Mother      Sleep Apnea " "Mother      Lipids Father      Genitourinary Problems Father         prostate problems - getting biopsy to rule out cancer     Prostate Cancer Father      Cerebrovascular Disease Maternal Grandfather         at late 50's yo     Alzheimer Disease Paternal Grandmother         at 70's yo     Heart Disease Paternal Grandfather         MI at 79 yo     Genitourinary Problems Paternal Grandfather         enlarged prostate on post-mortem autopsy     Diabetes Brother         type 1     Hypertension Maternal Aunt      Hypertension Maternal Uncle          Current Outpatient Medications   Medication Sig Dispense Refill     albuterol (PROAIR HFA/PROVENTIL HFA/VENTOLIN HFA) 108 (90 Base) MCG/ACT inhaler Inhale 2 puffs into the lungs every 6 hours for 10 days 1 Inhaler 0     fluticasone (FLONASE) 50 MCG/ACT nasal spray Spray 1 spray into both nostrils daily 16 g 11     Allergies   Allergen Reactions     Cats Shortness Of Breath     Nkda [No Known Drug Allergies]          Reviewed and updated as needed this visit by Provider  Tobacco  Allergies  Meds  Problems  Med Hx  Surg Hx  Fam Hx         Review of Systems   ROS COMP: Constitutional, HEENT, cardiovascular, pulmonary, GI, , musculoskeletal, neuro, skin, endocrine and psych systems are negative, except as otherwise noted.      Objective    /84   Pulse 74   Temp 97  F (36.1  C) (Tympanic)   Resp 18   Ht 1.759 m (5' 9.25\")   Wt 79.2 kg (174 lb 9.6 oz)   SpO2 98%   BMI 25.60 kg/m    Body mass index is 25.6 kg/m .  Physical Exam   GENERAL: well-nourished, alert and no distress  EYES: pink conjunctivae, no icterus  NECK: mild cervical adenopathy, nontender  HEENT: tympanic membrane intact and pearly bilaterally, nose with mild congestion, no sinus tenderness, throat not erythematous, no exudates, no oral ulcers  RESP: good air entry; equal sounds bilaterally; no crackles/wheezing today.  CV: regular rates and rhythm, normal S1 S2, no S3 or S4 and no murmur  SKIN: "  Good turgor, no rashes    Diagnostic Test Results:  Results for orders placed or performed during the hospital encounter of 10/11/19   XR Chest 2 Views    Narrative    CHEST TWO VIEWS    10/11/2019 7:02 PM     HISTORY: Dyspnea    COMPARISON: None.      Impression    IMPRESSION: No acute cardiopulmonary disease.    LYSSA GUNN MD   CBC with platelets differential   Result Value Ref Range    WBC 7.0 4.0 - 11.0 10e9/L    RBC Count 5.58 4.4 - 5.9 10e12/L    Hemoglobin 17.3 13.3 - 17.7 g/dL    Hematocrit 50.1 40.0 - 53.0 %    MCV 90 78 - 100 fl    MCH 31.0 26.5 - 33.0 pg    MCHC 34.5 31.5 - 36.5 g/dL    RDW 11.7 10.0 - 15.0 %    Platelet Count 164 150 - 450 10e9/L    Diff Method Automated Method     % Neutrophils 62.5 %    % Lymphocytes 19.5 %    % Monocytes 9.8 %    % Eosinophils 7.5 %    % Basophils 0.4 %    % Immature Granulocytes 0.3 %    Nucleated RBCs 0 0 /100    Absolute Neutrophil 4.4 1.6 - 8.3 10e9/L    Absolute Lymphocytes 1.4 0.8 - 5.3 10e9/L    Absolute Monocytes 0.7 0.0 - 1.3 10e9/L    Absolute Eosinophils 0.5 0.0 - 0.7 10e9/L    Absolute Basophils 0.0 0.0 - 0.2 10e9/L    Abs Immature Granulocytes 0.0 0 - 0.4 10e9/L    Absolute Nucleated RBC 0.0    Comprehensive metabolic panel   Result Value Ref Range    Sodium 140 133 - 144 mmol/L    Potassium 4.0 3.4 - 5.3 mmol/L    Chloride 106 94 - 109 mmol/L    Carbon Dioxide 26 20 - 32 mmol/L    Anion Gap 8 3 - 14 mmol/L    Glucose 128 (H) 70 - 99 mg/dL    Urea Nitrogen 18 7 - 30 mg/dL    Creatinine 1.18 0.66 - 1.25 mg/dL    GFR Estimate 69 >60 mL/min/[1.73_m2]    GFR Estimate If Black 79 >60 mL/min/[1.73_m2]    Calcium 9.1 8.5 - 10.1 mg/dL    Bilirubin Total 1.3 0.2 - 1.3 mg/dL    Albumin 4.3 3.4 - 5.0 g/dL    Protein Total 7.4 6.8 - 8.8 g/dL    Alkaline Phosphatase 81 40 - 150 U/L    ALT 69 0 - 70 U/L    AST 36 0 - 45 U/L   Troponin I   Result Value Ref Range    Troponin I ES <0.015 0.000 - 0.045 ug/L   Nt probnp inpatient (BNP)   Result Value Ref Range     N-Terminal Pro BNP Inpatient 45 0 - 900 pg/mL           Assessment & Plan     Osmin was seen today for er f/u, flu shot and imm/inj.    Diagnoses and all orders for this visit:    Acute bronchospasm  -     General PFT Lab (Please always keep checked); Future  -     Pulmonary Function Test; Future  No distress and no active wheezing now.  Albuterol use reinforced.  DDx: adult onset asthma, allergic bronchospasm, non-tobacco related COPD.  Patient concurred with PFT to further arrive at diagnosis.  Reasons to go to ER discussed in detail with patient.    Allergy to cats  Advised avoidance if possible.  If cannot avoid, try premedicating with cetirizine 10 mg daily before exposure.  Consider alelrgy consult to verify reactions.    Allergic rhinitis with postnasal drip  -     fluticasone (FLONASE) 50 MCG/ACT nasal spray; Spray 1 spray into both nostrils daily  Patient was advised on causes, course, treatment and possible complication of allergic rhinitis.  Discussed in detail current guidelines and first line treatment of the condition.  Patient agreed to the above.  May also trigger bronchospasms.  Observe for triggers and avoid if possible.    Elevated blood sugar level  -     Hemoglobin A1c; Future  -     Glucose; Future  Advised further testing. Patient concurred.    Advanced directives, counseling/discussion  Discussed benefits of having advanced health care directives in place.  Patient was given handout/info about getting this done.    HDL deficiency  -     Lipid panel reflex to direct LDL Fasting; Future  Reinforced heart healthy lifestyle.    Need for prophylactic vaccination and inoculation against influenza  -     INFLUENZA VACCINE IM > 6 MONTHS VALENT IIV4 [40612]  -     Vaccine Administration, Initial [11140]          Patient Instructions   Albuterol inhaler 2 puffs every 4 hrs as needed for wheezing or coughing spells or tightness in breathing while active.    Call to schedule your lab tests; make sure  you are fasting for more than 12 hrs.    For your lung test, please call [phone] to schedule.      Patient Education     Bronchospasm (Adult)    Bronchospasm occurs when the airways (bronchial tubes) go into spasm and contract. This makes it hard to breathe and causes wheezing (a high-pitched whistling sound). Bronchospasm can also cause frequent coughing without wheezing.  Bronchospasm is due to irritation, inflammation, or allergic reaction of the airways. People with asthma get bronchospasm. However, not everyone with bronchospasm has asthma.  Being exposed to harmful fumes, a recent case of bronchitis, exercise, or a flare-up of chronic obstructive pulmonary disease (COPD) may cause the airways to spasm. An episode of bronchospasm may last 7 to 14 days. Medicine may be prescribed to relax the airways and prevent wheezing. Antibiotics will be prescribed only if your healthcare provider thinks there is a bacterial infection. Antibiotics do not help a viral infection.  Home care    Drink lots of water or other fluids (at least 10 glasses a day) during an attack. This will loosen lung secretions and make it easier to breathe. If you have heart or kidney disease, check with your doctor before you drink extra fluids.    Take prescribed medicine exactly at the times advised. If you take an inhaled medicine to help with breathing, don't use it more than once every 4 hours, unless told to do so. If prescribed an antibiotic or prednisone, take all of the medicine, even if you are feeling better after a few days.    Don't smoke. Also avoid being exposed to secondhand smoke.    If you were given an inhaler, use it exactly as directed. If you need to use it more often than prescribed, your condition may be getting worse. Contact your healthcare provider.  Follow-up care  Follow up with your healthcare provider, or as advised.  If you are age 65 or older, have a chronic lung disease or condition that affects your immune  system, or you smoke, ask your healthcare provider about getting a pneumococcal vaccine, as well as a yearly flu shot (influenza vaccine).  When to seek medical advice  Call your healthcare provider right away if any of these occur:    You need to use your inhalers more often than usual    Fever of 100.4 F (38 C) or higher, or as directed by your healthcare provider    Cough that brings up lots of dark-colored sputum (mucus)    You don't get better within 24 hours  Call 911  Call 911 if any of these occur:    Coughing up bloody sputum (mucus)    Chest pain with each breath    Increased wheezing or shortness of breath  Date Last Reviewed: 6/1/2018 2000-2018 [employer]. [address] [place] [address], [place] [address]. All rights reserved. This information is not intended as a substitute for professional medical care. Always follow your healthcare professional's instructions.               Return in about 2 months (around 12/21/2019) for wellness visit.    Mendoza Carter MD  [facility]

## 2019-10-21 NOTE — PATIENT INSTRUCTIONS
Albuterol inhaler 2 puffs every 4 hrs as needed for wheezing or coughing spells or tightness in breathing while active.    Call to schedule your lab tests; make sure you are fasting for more than 12 hrs.    For your lung test, please call [phone] to schedule.      Patient Education     Bronchospasm (Adult)    Bronchospasm occurs when the airways (bronchial tubes) go into spasm and contract. This makes it hard to breathe and causes wheezing (a high-pitched whistling sound). Bronchospasm can also cause frequent coughing without wheezing.  Bronchospasm is due to irritation, inflammation, or allergic reaction of the airways. People with asthma get bronchospasm. However, not everyone with bronchospasm has asthma.  Being exposed to harmful fumes, a recent case of bronchitis, exercise, or a flare-up of chronic obstructive pulmonary disease (COPD) may cause the airways to spasm. An episode of bronchospasm may last 7 to 14 days. Medicine may be prescribed to relax the airways and prevent wheezing. Antibiotics will be prescribed only if your healthcare provider thinks there is a bacterial infection. Antibiotics do not help a viral infection.  Home care    Drink lots of water or other fluids (at least 10 glasses a day) during an attack. This will loosen lung secretions and make it easier to breathe. If you have heart or kidney disease, check with your doctor before you drink extra fluids.    Take prescribed medicine exactly at the times advised. If you take an inhaled medicine to help with breathing, don't use it more than once every 4 hours, unless told to do so. If prescribed an antibiotic or prednisone, take all of the medicine, even if you are feeling better after a few days.    Don't smoke. Also avoid being exposed to secondhand smoke.    If you were given an inhaler, use it exactly as directed. If you need to use it more often than prescribed, your condition may be getting worse. Contact your healthcare  provider.  Follow-up care  Follow up with your healthcare provider, or as advised.  If you are age 65 or older, have a chronic lung disease or condition that affects your immune system, or you smoke, ask your healthcare provider about getting a pneumococcal vaccine, as well as a yearly flu shot (influenza vaccine).  When to seek medical advice  Call your healthcare provider right away if any of these occur:    You need to use your inhalers more often than usual    Fever of 100.4 F (38 C) or higher, or as directed by your healthcare provider    Cough that brings up lots of dark-colored sputum (mucus)    You don't get better within 24 hours  Call 911  Call 911 if any of these occur:    Coughing up bloody sputum (mucus)    Chest pain with each breath    Increased wheezing or shortness of breath  Date Last Reviewed: 6/1/2018 2000-2018 [employer]. [address] [place] [address]. All rights reserved. This information is not intended as a substitute for professional medical care. Always follow your healthcare professional's instructions.

## 2019-10-24 ENCOUNTER — HOSPITAL ENCOUNTER (OUTPATIENT)
Dept: RESPIRATORY THERAPY | Facility: CLINIC | Age: 54
Discharge: HOME | End: 2019-10-24
Attending: FAMILY MEDICINE | Admitting: FAMILY MEDICINE
Payer: COMMERCIAL

## 2019-10-24 DIAGNOSIS — J98.01 ACUTE BRONCHOSPASM: ICD-10-CM

## 2019-10-24 PROCEDURE — 94060 EVALUATION OF WHEEZING: CPT | Mod: 26 | Performed by: INTERNAL MEDICINE

## 2019-10-24 PROCEDURE — 94729 DIFFUSING CAPACITY: CPT | Mod: 26 | Performed by: INTERNAL MEDICINE

## 2019-10-24 PROCEDURE — 94729 DIFFUSING CAPACITY: CPT

## 2019-10-24 PROCEDURE — 94726 PLETHYSMOGRAPHY LUNG VOLUMES: CPT | Mod: 26 | Performed by: INTERNAL MEDICINE

## 2019-10-24 PROCEDURE — 94060 EVALUATION OF WHEEZING: CPT

## 2019-10-24 PROCEDURE — 25000125 ZZHC RX 250: Performed by: FAMILY MEDICINE

## 2019-10-24 PROCEDURE — 94726 PLETHYSMOGRAPHY LUNG VOLUMES: CPT

## 2019-10-24 RX ORDER — ALBUTEROL SULFATE 0.83 MG/ML
2.5 SOLUTION RESPIRATORY (INHALATION) ONCE
Status: COMPLETED | OUTPATIENT
Start: 2019-10-24 | End: 2019-10-24

## 2019-10-24 RX ADMIN — ALBUTEROL SULFATE 2.5 MG: 2.5 SOLUTION RESPIRATORY (INHALATION) at 10:00

## 2019-10-29 ENCOUNTER — OFFICE VISIT (OUTPATIENT)
Dept: FAMILY MEDICINE | Facility: CLINIC | Age: 54
End: 2019-10-29
Payer: COMMERCIAL

## 2019-10-29 VITALS
OXYGEN SATURATION: 96 % | HEART RATE: 79 BPM | BODY MASS INDEX: 25.95 KG/M2 | WEIGHT: 175.2 LBS | DIASTOLIC BLOOD PRESSURE: 86 MMHG | SYSTOLIC BLOOD PRESSURE: 134 MMHG | RESPIRATION RATE: 16 BRPM | TEMPERATURE: 98.1 F | HEIGHT: 69 IN

## 2019-10-29 DIAGNOSIS — Z00.00 ROUTINE GENERAL MEDICAL EXAMINATION AT A HEALTH CARE FACILITY: Primary | ICD-10-CM

## 2019-10-29 DIAGNOSIS — J98.01 BRONCHOSPASM: ICD-10-CM

## 2019-10-29 DIAGNOSIS — R73.9 ELEVATED BLOOD SUGAR LEVEL: ICD-10-CM

## 2019-10-29 DIAGNOSIS — E78.6 HDL DEFICIENCY: ICD-10-CM

## 2019-10-29 DIAGNOSIS — Z12.5 SCREENING FOR PROSTATE CANCER: ICD-10-CM

## 2019-10-29 LAB
CHOLEST SERPL-MCNC: 182 MG/DL
DLCOCOR-%PRED-PRE: 88 %
DLCOCOR-PRE: 24.23 ML/MIN/MMHG
DLCOUNC-%PRED-PRE: 94 %
DLCOUNC-PRE: 25.9 ML/MIN/MMHG
DLCOUNC-PRED: 27.47 ML/MIN/MMHG
ERV-%PRED-PRE: 40 %
ERV-PRE: 0.52 L
ERV-PRED: 1.29 L
EXPTIME-PRE: 8.1 SEC
FEF2575-%PRED-POST: 64 %
FEF2575-%PRED-PRE: 29 %
FEF2575-POST: 2.04 L/SEC
FEF2575-PRE: 0.94 L/SEC
FEF2575-PRED: 3.15 L/SEC
FEFMAX-%PRED-PRE: 53 %
FEFMAX-PRE: 4.96 L/SEC
FEFMAX-PRED: 9.27 L/SEC
FEV1-%PRED-PRE: 55 %
FEV1-PRE: 2 L
FEV1FEV6-PRE: 63 %
FEV1FEV6-PRED: 80 %
FEV1FVC-PRE: 60 %
FEV1FVC-PRED: 78 %
FEV1SVC-PRE: 50 %
FEV1SVC-PRED: 74 %
FIFMAX-PRE: 3.72 L/SEC
FRCPLETH-%PRED-PRE: 136 %
FRCPLETH-PRE: 4.81 L
FRCPLETH-PRED: 3.52 L
FVC-%PRED-PRE: 71 %
FVC-PRE: 3.31 L
FVC-PRED: 4.63 L
GLUCOSE SERPL-MCNC: 130 MG/DL (ref 70–99)
HBA1C MFR BLD: 6.6 % (ref 0–5.6)
HDLC SERPL-MCNC: 44 MG/DL
IC-%PRED-PRE: 93 %
IC-PRE: 3.38 L
IC-PRED: 3.61 L
LDLC SERPL CALC-MCNC: 107 MG/DL
NONHDLC SERPL-MCNC: 138 MG/DL
PSA SERPL-ACNC: 1.14 UG/L (ref 0–4)
RVPLETH-%PRED-PRE: 184 %
RVPLETH-PRE: 4.23 L
RVPLETH-PRED: 2.3 L
TLCPLETH-%PRED-PRE: 118 %
TLCPLETH-PRE: 8.19 L
TLCPLETH-PRED: 6.92 L
TRIGL SERPL-MCNC: 155 MG/DL
VA-%PRED-PRE: 93 %
VA-PRE: 5.93 L
VC-%PRED-PRE: 80 %
VC-PRE: 3.96 L
VC-PRED: 4.91 L

## 2019-10-29 PROCEDURE — 83036 HEMOGLOBIN GLYCOSYLATED A1C: CPT | Performed by: FAMILY MEDICINE

## 2019-10-29 PROCEDURE — 82947 ASSAY GLUCOSE BLOOD QUANT: CPT | Performed by: FAMILY MEDICINE

## 2019-10-29 PROCEDURE — G0103 PSA SCREENING: HCPCS | Performed by: FAMILY MEDICINE

## 2019-10-29 PROCEDURE — 99396 PREV VISIT EST AGE 40-64: CPT | Mod: 25 | Performed by: FAMILY MEDICINE

## 2019-10-29 PROCEDURE — 36415 COLL VENOUS BLD VENIPUNCTURE: CPT | Performed by: FAMILY MEDICINE

## 2019-10-29 PROCEDURE — 99213 OFFICE O/P EST LOW 20 MIN: CPT | Mod: 25 | Performed by: FAMILY MEDICINE

## 2019-10-29 PROCEDURE — 80061 LIPID PANEL: CPT | Performed by: FAMILY MEDICINE

## 2019-10-29 ASSESSMENT — MIFFLIN-ST. JEOR: SCORE: 1619.04

## 2019-10-29 NOTE — PROGRESS NOTES
3  SUBJECTIVE:   CC: Osmin Yan is an 54 year old male who presents for preventive health visit.     Healthy Habits:    Do you get at least three servings of calcium containing foods daily (dairy, green leafy vegetables, etc.)? no, taking calcium and/or vitamin D supplement: no    Amount of exercise or daily activities, outside of work: none due to wheezing    Problems taking medications regularly No    Medication side effects: Yes possible bloody noses from flonase    Have you had an eye exam in the past two years? yes    Do you see a dentist twice per year? Once per yr    Do you have sleep apnea, excessive snoring or daytime drowsiness? yes, pt snores    Patient was advised that concern below is separate from preventive exam and may be billed as a separate encounter.  He verbalized understanding and would like to address today.    Questions regarding his asthma.  Since he was done with prednisone, he has been using albuterol inhaler average 1-2 x a day.  Patient states when he flew to [place] for a few days, he had more wheezing there compared to here.  Patient was able to walk 6 miles continuously in [place] though as long as he uses albuterol.    Today's PHQ-2 Score:   PHQ-2 ( 1999 [employer]) 10/21/2019 6/15/2017   Q1: Little interest or pleasure in doing things 0 0   Q2: Feeling down, depressed or hopeless 0 0   PHQ-2 Score 0 0       Abuse: Current or Past(Physical, Sexual or Emotional)- No  Do you feel safe in your environment? Yes    Social History     Tobacco Use     Smoking status: Never Smoker     Smokeless tobacco: Never Used   Substance Use Topics     Alcohol use: No     If you drink alcohol do you typically have >3 drinks per day or >7 drinks per week? No                      Last PSA:   PSA   Date Value Ref Range Status   10/29/2019 1.14 0 - 4 ug/L Final     Comment:     Assay Method:  Chemiluminescence using Siemens Vista analyzer       Reviewed orders with patient. Reviewed health maintenance and  updated orders accordingly - Yes  Patient Active Problem List   Diagnosis     Lipid disorder     Cholelithiasis     Calculus of gallbladder with other cholecystitis, without mention of obstruction     CARDIOVASCULAR SCREENING; LDL GOAL LESS THAN 160     HDL deficiency     Elevated ferritin     Allergy to cats     Past Surgical History:   Procedure Laterality Date     COLONOSCOPY N/A 10/27/2017    Procedure: COLONOSCOPY;  colonoscopy;  Surgeon: Robert Monzon MD;  Location: [facility]     LAPAROSCOPIC CHOLECYSTECTOMY  3/21/2012    Procedure:LAPAROSCOPIC CHOLECYSTECTOMY; Laparoscopic Cholecystectomy; Surgeon:ROSALIE PEDROZA; Location:[facility]     SURGICAL HISTORY OF -   2005    none       Social History     Tobacco Use     Smoking status: Never Smoker     Smokeless tobacco: Never Used   Substance Use Topics     Alcohol use: No     Family History   Problem Relation Age of Onset     Hypertension Mother      Sleep Apnea Mother      Lipids Father      Genitourinary Problems Father         prostate problems - getting biopsy to rule out cancer     Prostate Cancer Father      Cerebrovascular Disease Maternal Grandfather         at late 50's yo     Alzheimer Disease Paternal Grandmother         at 70's yo     Heart Disease Paternal Grandfather         MI at 81 yo     Genitourinary Problems Paternal Grandfather         enlarged prostate on post-mortem autopsy     Diabetes Brother         type 1     Hypertension Maternal Aunt      Hypertension Maternal Uncle          Current Outpatient Medications   Medication Sig Dispense Refill     fluticasone (FLONASE) 50 MCG/ACT nasal spray Spray 1 spray into both nostrils daily 16 g 11     albuterol (PROAIR HFA/PROVENTIL HFA/VENTOLIN HFA) 108 (90 Base) MCG/ACT inhaler Inhale 2 puffs into the lungs every 6 hours for 10 days 1 Inhaler 0     Allergies   Allergen Reactions     Cats Shortness Of Breath     Nkda [No Known Drug Allergies]        Reviewed and updated as needed this  "visit by clinical staff  Tobacco  Allergies  Meds  Problems  Med Hx  Surg Hx  Fam Hx  Soc Hx          Reviewed and updated as needed this visit by Provider  Tobacco  Allergies  Meds  Problems  Med Hx  Surg Hx  Fam Hx        Past Medical History:   Diagnosis Date     NO ACTIVE PROBLEMS       Past Surgical History:   Procedure Laterality Date     COLONOSCOPY N/A 10/27/2017    Procedure: COLONOSCOPY;  colonoscopy;  Surgeon: Robert Monzon MD;  Location: [facility]     LAPAROSCOPIC CHOLECYSTECTOMY  3/21/2012    Procedure:LAPAROSCOPIC CHOLECYSTECTOMY; Laparoscopic Cholecystectomy; Surgeon:ROSALIE PEDROZA; Location:[facility]     SURGICAL HISTORY OF -   2005    none     Patient denies BM or urinary changes.  No fam hx of colon cancer.  No fam hx of prostate cancer.    ROS:  CONSTITUTIONAL: NEGATIVE for fever, chills, change in weight  INTEGUMENTARY/SKIN: NEGATIVE for worrisome rashes, moles or lesions  EYES: NEGATIVE for vision changes or irritation  ENT: NEGATIVE for ear, mouth and throat problems  RESP: NEGATIVE for significant cough or SOB  CV: NEGATIVE for chest pain, palpitations or peripheral edema  GI: NEGATIVE for nausea, abdominal pain, heartburn, or change in bowel habits   male: negative for dysuria, hematuria, decreased urinary stream, erectile dysfunction, urethral discharge  MUSCULOSKELETAL: NEGATIVE for significant arthralgias or myalgia  NEURO: NEGATIVE for weakness, dizziness or paresthesias  ENDOCRINE: NEGATIVE for temperature intolerance, skin/hair changes  HEME/ALLERGY/IMMUNE: NEGATIVE for bleeding problems  PSYCHIATRIC: NEGATIVE for changes in mood or affect    OBJECTIVE:   /86   Pulse 79   Temp 98.1  F (36.7  C) (Tympanic)   Resp 16   Ht 1.759 m (5' 9.25\")   Wt 79.5 kg (175 lb 3.2 oz)   SpO2 96%   BMI 25.69 kg/m    EXAM:  GENERAL APPEARANCE: overweight,  alert and no distress  EYES: pink conj, no icterus, PERRL, EOMI  HENT: ear canals and TM's normal, nose and " mouth without ulcers or lesions, oropharynx clear and oral mucous membranes moist  NECK: no adenopathy, no asymmetry, masses, or scars and thyroid normal to palpation  RESP: lungs clear to auscultation - no rales, rhonchi or wheezes  CV: regular rates and rhythm, normal S1 S2, no S3 or S4, no murmur, click or rub, no peripheral edema and peripheral pulses strong  ABDOMEN: soft, nontender, no hepatosplenomegaly, no masses and bowel sounds normal  RECTAL: deferred  MS: no musculoskeletal defects are noted and gait is age appropriate without ataxia  SKIN: no suspicious lesions or rashes  NEURO: Normal strength and tone, sensory exam grossly normal, mentation intact and speech normal    Diagnostic Test Results:  Results for orders placed or performed in visit on 10/29/19   Prostate spec antigen screen   Result Value Ref Range    PSA 1.14 0 - 4 ug/L   Lipid panel reflex to direct LDL Fasting   Result Value Ref Range    Cholesterol 182 <200 mg/dL    Triglycerides 155 (H) <150 mg/dL    HDL Cholesterol 44 >39 mg/dL    LDL Cholesterol Calculated 107 (H) <100 mg/dL    Non HDL Cholesterol 138 (H) <130 mg/dL   Glucose   Result Value Ref Range    Glucose 130 (H) 70 - 99 mg/dL   Hemoglobin A1c   Result Value Ref Range    Hemoglobin A1C 6.6 (H) 0 - 5.6 %       ASSESSMENT/PLAN:   Osmin was seen today for physical.    Diagnoses and all orders for this visit:    Routine general medical examination at a health care facility  Patient was advised on recommended screening and preventive health recommendations.  He verbalized understanding and agreed to the plans below.    Bronchospasm  -     OFFICE/OUTPT VISIT,EST,LEVL III  Patient uses albuterol about 1-2 x a day still. He does not have wheezing though.  May still have intermittent bronchospasm.  Awaiting final report for PFT.   Consider inahled corticosteroid if still symptomatic after next 1-2 weeks.  Return precautions discussed and given to patient.    Screening for prostate  "cancer  -     Prostate spec antigen screen    HDL deficiency  -     Lipid panel reflex to direct LDL Fasting  Reinforced heart healthy lifestyle.    Elevated blood sugar level  -     Glucose  -     Hemoglobin A1c  Advised ways to reduce glucose levels: carb control, high fiber diet, portion control, regular exercise.        COUNSELING:  Reviewed preventive health counseling, as reflected in patient instructions       Regular exercise       Healthy diet/nutrition       Vision screening       Hearing screening       Aspirin Prophylaxsis       Alcohol Use       Safe sex practices/STD prevention       Colon cancer screening       Prostate cancer screening       Osteoporosis Prevention/Bone Health       The 10-year ASCVD risk score (Mathew COLLIER Jr., et al., 2013) is: 6.7%    Values used to calculate the score:      Age: 54 years      Sex: Male      Is Non- : No      Diabetic: No      Tobacco smoker: No      Systolic Blood Pressure: 134 mmHg      Is BP treated: No      HDL Cholesterol: 44 mg/dL      Total Cholesterol: 182 mg/dL    Estimated body mass index is 25.69 kg/m  as calculated from the following:    Height as of this encounter: 1.759 m (5' 9.25\").    Weight as of this encounter: 79.5 kg (175 lb 3.2 oz).    Weight management plan: Discussed healthy diet and exercise guidelines     reports that he has never smoked. He has never used smokeless tobacco.      Counseling Resources:  ATP IV Guidelines  Pooled Cohorts Equation Calculator  FRAX Risk Assessment  ICSI Preventive Guidelines  Dietary Guidelines for Americans, 2010  USDA's MyPlate  ASA Prophylaxis  Lung CA Screening    Mendoza Carter MD  [facility]  "

## 2019-10-29 NOTE — PATIENT INSTRUCTIONS
Be consistent with low salt, low trans fat and low saturated fat diet.  Eat food rich in omega-3-fatty acids as you tolerate. (salmon, olive oil)  Eat 5 cups of vegetables, fruits and whole grains per day.  Limit starchy food (white rice, white bread, white pasta, white potatoes) to less than a cup per meal.  Minimize sweets, junk food and fastfood. Limit soda beverages to one serving per day; best to avoid it altogether though.    Exercise: moderate intensity sustained for at least 30 mins per episode, goal of 150 mins per week at least  Combine cardiovascular and resistance exercises.  These exercise recommendations are in addition to your daily activity at work or home.  Work on losing weight.    Regular foot care; don't walk barefoot  Annual eye exam.    Flu vaccine every fall (October-November).    Blood tests: You will be contacted in 1-2 days for results of your lab tests.    You will be called for result of the lung test when it becomes available in next several days.  Albuterol inhaler 2 puffs every 4 hrs as needed for wheezing or coughing spells or tightness in breathing while active.  Use albuterol also before exercise.  Possible inhaled daily steroid if confirmed asthma or if you continue to have frequent wheezing.    You may get the Shingrix vaccine for shingles if you desire, and after you verify with insurance how they cover the vaccine.    Preventative Care Visits include: Yearly physicals, Well-child visits, Welcome to Medicare visits, & Medicare yearly wellness exams.    The purpose of these visits is to discuss your medical history and prevent health problems before you are sick.  You may need to pay a copay, coinsurance or deductible if your visit today includes testing or treating for a new or existing condition.    Additional charges may be incurred for today's visit. If you have questions about what your insurance plan covers, please contact your Insurance Company's member service  department.  If you have questions specific to a bill you have already received from [employer], please contact the [employer] Billing Office at [phone].     Preventive Health Recommendations  Male Ages 50 - 64    Yearly exam:             See your health care provider every year in order to  o   Review health changes.   o   Discuss preventive care.    o   Review your medicines if your doctor has prescribed any.     Have a cholesterol test every 5 years, or more frequently if you are at risk for high cholesterol/heart disease.     Have a diabetes test (fasting glucose) every three years. If you are at risk for diabetes, you should have this test more often.     Have a colonoscopy at age 50, or have a yearly FIT test (stool test). These exams will check for colon cancer.      Talk with your health care provider about whether or not a prostate cancer screening test (PSA) is right for you.    You should be tested each year for STDs (sexually transmitted diseases), if you re at risk.     Shots: Get a flu shot each year. Get a tetanus shot every 10 years.     Nutrition:    Eat at least 5 servings of fruits and vegetables daily.     Eat whole-grain bread, whole-wheat pasta and brown rice instead of white grains and rice.     Get adequate Calcium and Vitamin D.     Lifestyle    Exercise for at least 150 minutes a week (30 minutes a day, 5 days a week). This will help you control your weight and prevent disease.     Limit alcohol to one drink per day.     No smoking.     Wear sunscreen to prevent skin cancer.     See your dentist every six months for an exam and cleaning.     See your eye doctor every 1 to 2 years.    Patient Education     Low-Salt Diet  This diet removes foods that are high in salt. It also limits the amount of salt you use when cooking. It is most often used for people with high blood pressure, edema (fluid retention), and kidney, liver, or heart disease.  Table salt contains the mineral sodium. Your  body needs sodium to work normally. But too much sodium can make your health problems worse. Your healthcare provider is recommending a low-salt (also called low-sodium) diet for you. Your total daily allowance of salt is 1,500 to 2,300 milligrams (mg). It is less than 1 teaspoon of table salt. This means you can have only about 500 to 700 mg of sodium at each meal. People with certain health problems should limit salt intake to the lower end of the recommended range.    When you cook, don t add much salt. If you can cook without using salt, even better. Don t add salt to your food at the table.  When shopping, read food labels. Salt is often called sodium on the label. Choose foods that are salt-free, low salt, or very low salt. Note that foods with reduced salt may not lower your salt intake enough.    Beans, potatoes, and pasta  Ok: Dry beans, split peas, lentils, potatoes, rice, macaroni, pasta, spaghetti without added salt  Avoid: Potato chips, tortilla chips, and similar products  Breads and cereals  Ok: Low-sodium breads, rolls, cereals, and cakes; low-salt crackers, matzo crackers  Avoid: Salted crackers, pretzels, popcorn, Austrian toast, pancakes, muffins  Dairy  Ok: Milk, chocolate milk, hot chocolate mix, low-salt cheeses, and yogurt  Avoid: Processed cheese and cheese spreads; Roquefort, Camembert, and cottage cheese; buttermilk, instant breakfast drink  Desserts  Ok: Ice cream, frozen yogurt, juice bars, gelatin, cookies and pies, sugar, honey, jelly, hard candy  Avoid: Most pies, cakes and cookies prepared or processed with salt; instant pudding  Drinks  Ok: Tea, coffee, fizzy (carbonated) drinks, juices  Avoid: Flavored coffees, electrolyte replacement drinks, sports drinks  Meats  Ok: All fresh meat, fish, poultry, low-salt tuna, eggs, egg substitute  Avoid: Smoked, pickled, brine-cured, or salted meats and fish. This includes parham, chipped beef, corned beef, hot dogs, deli meats, ham, kosher meats,  salt pork, sausage, canned tuna, salted codfish, smoked salmon, herring, sardines, or anchovies.  Seasonings and spices  Ok: Most seasonings are okay. Good substitutes for salt include: fresh herb blends, hot sauce, lemon, garlic, collado, vinegar, dry mustard, parsley, cilantro, horseradish, tomato paste, regular margarine, mayonnaise, unsalted butter, cream cheese, vegetable oil, cream, low-salt salad dressing and gravy.  Avoid: Regular ketchup, relishes, pickles, soy sauce, teriyaki sauce, Worcestershire sauce, BBQ sauce, tartar sauce, meat tenderizer, chili sauce, regular gravy, regular salad dressing, salted butter  Soups  Ok: Low-salt soups and broths made with allowed foods  Avoid: Bouillon cubes, soups with smoked or salted meats, regular soup and broth  Vegetables  Ok: Most vegetables are okay; also low-salt tomato and vegetable juices  Avoid: Sauerkraut and other brine-soaked vegetables; pickles and other pickled vegetables; tomato juice, olives  Date Last Reviewed: 8/1/2016 2000-2018 [employer]. [address] [place] [address], [place] [address]. All rights reserved. This information is not intended as a substitute for professional medical care. Always follow your healthcare professional's instructions.

## 2019-10-31 ENCOUNTER — MYC MEDICAL ADVICE (OUTPATIENT)
Dept: FAMILY MEDICINE | Facility: CLINIC | Age: 54
End: 2019-10-31

## 2019-10-31 DIAGNOSIS — J45.30 MILD PERSISTENT ASTHMA WITHOUT COMPLICATION: Primary | ICD-10-CM

## 2019-10-31 NOTE — TELEPHONE ENCOUNTER
Patient notified of prescription sent to pharmacy today via my chart message.    Maritza LAZARO Rn

## 2019-11-01 ENCOUNTER — TELEPHONE (OUTPATIENT)
Dept: FAMILY MEDICINE | Facility: CLINIC | Age: 54
End: 2019-11-01

## 2019-11-01 NOTE — TELEPHONE ENCOUNTER
Prior Authorization Retail Medication Request    Medication/Dose: Arnuity Ellipta  ICD code (if different than what is on RX):    Previously Tried and Failed:    Rationale:      Insurance Name: [facility]  Insurance ID:  [id number]      Pharmacy Information (if different than what is on RX)  Name:  [facility]ST[facility] Segal  Phone:  [phone]

## 2019-11-05 NOTE — TELEPHONE ENCOUNTER
Central Prior Authorization Team   Phone: [phone]      PA -INSURANCE TERMED    Medication: Arnuity Ellipta-INSURANCE TERMED  Insurance Company: [employer] - Phone [phone] Fax [phone]  Pharmacy Filling the Rx: [facility] - [place], [place] - [address]  Filling Pharmacy Phone: [phone]  Filling Pharmacy Fax:    Start Date: 11/5/2019    Started PA on CMM and a response of Dismissed.  Error.This is no longer a [employer] member. The member's coverage ended on 10/31/2019. Please contact the member for current information.  Called and informed pharmacy and they will reach out to the patient to get new insurance.

## 2019-11-11 ENCOUNTER — ALLIED HEALTH/NURSE VISIT (OUTPATIENT)
Dept: FAMILY MEDICINE | Facility: CLINIC | Age: 54
End: 2019-11-11
Payer: COMMERCIAL

## 2019-11-11 DIAGNOSIS — J45.30 MILD PERSISTENT ASTHMA: Primary | ICD-10-CM

## 2019-11-11 DIAGNOSIS — J45.30 MILD PERSISTENT ASTHMA WITHOUT COMPLICATION: ICD-10-CM

## 2019-11-11 PROCEDURE — 99207 ZZC NO CHARGE NURSE ONLY: CPT

## 2019-11-11 RX ORDER — ALBUTEROL SULFATE 90 UG/1
2 AEROSOL, METERED RESPIRATORY (INHALATION) EVERY 6 HOURS
Qty: 1 INHALER | Refills: 0 | Status: SHIPPED | OUTPATIENT
Start: 2019-11-11 | End: 2022-03-02

## 2019-11-11 NOTE — NURSING NOTE
Patient never got the steroid inhaler filled as his insurance lapsed.  He is out of his albuterol inhaler also.  I have called our pharmacy and they will work with patient to get the cost down.  I have refilled his albuterol inhaler and asked he follow up with PCP. Alyssa FORBES RN

## 2019-12-02 ENCOUNTER — TELEPHONE (OUTPATIENT)
Dept: FAMILY MEDICINE | Facility: CLINIC | Age: 54
End: 2019-12-02

## 2019-12-02 ENCOUNTER — OFFICE VISIT (OUTPATIENT)
Dept: FAMILY MEDICINE | Facility: CLINIC | Age: 54
End: 2019-12-02
Payer: COMMERCIAL

## 2019-12-02 VITALS
HEIGHT: 69 IN | TEMPERATURE: 97.7 F | BODY MASS INDEX: 26.51 KG/M2 | DIASTOLIC BLOOD PRESSURE: 88 MMHG | SYSTOLIC BLOOD PRESSURE: 130 MMHG | WEIGHT: 179 LBS | OXYGEN SATURATION: 96 % | RESPIRATION RATE: 16 BRPM | HEART RATE: 70 BPM

## 2019-12-02 DIAGNOSIS — J45.30 MILD PERSISTENT ASTHMA WITHOUT COMPLICATION: Primary | ICD-10-CM

## 2019-12-02 DIAGNOSIS — E11.9 TYPE 2 DIABETES MELLITUS WITHOUT COMPLICATION, WITHOUT LONG-TERM CURRENT USE OF INSULIN (H): ICD-10-CM

## 2019-12-02 PROCEDURE — 99214 OFFICE O/P EST MOD 30 MIN: CPT | Performed by: FAMILY MEDICINE

## 2019-12-02 PROCEDURE — 99207 C FOOT EXAM  NO CHARGE: CPT | Performed by: FAMILY MEDICINE

## 2019-12-02 RX ORDER — ATORVASTATIN CALCIUM 10 MG/1
10 TABLET, FILM COATED ORAL DAILY
Qty: 90 TABLET | Refills: 3 | Status: SHIPPED | OUTPATIENT
Start: 2019-12-02 | End: 2022-03-28

## 2019-12-02 ASSESSMENT — MIFFLIN-ST. JEOR: SCORE: 1636.28

## 2019-12-02 NOTE — TELEPHONE ENCOUNTER
Prior Authorization Retail Medication Request    Medication/Dose: Arnuity Ellipta 50mcg/act inhaler  ICD code (if different than what is on RX):    Previously Tried and Failed:    Rationale:      Insurance Name:  [facility]  Insurance ID:  [id number]      Pharmacy Information (if different than what is on RX)  Name:    Phone:      Thanks,   Lynsey Al  Certified Pharmacy Technician  [facility]  [phone]

## 2019-12-02 NOTE — PATIENT INSTRUCTIONS
Start atorvastatin 10 mg daily.  You will be contacted in 1-2 business days to get a schedule for the diabetes educator.    You are diagnosed with Type 2 diabetes mellitus.  As discussed today, the following are recommended:    - Medication: deferred per our discussion today; start statin  - Carbohydrate controlled diet; see below for details  - Exercise: 150 mins of aerobic activity per week as minimum goal  - Weight management  - Diabetes educator consultation  - Check Blood sugars at home:  - Do not walk barefoot; check feet daily for sores.  - get a flu vaccine every fall  - get annual eye exam, and regular dental exams  - Repeat blood tests: Call to schedule repeat lab tests in 3 months (March 2020); make sure you are fasting for more than 12 hrs.     Continue Arnuity daily.  Albuterol inhaler 2 puffs every 4 hrs as needed for wheezing or coughing spells or tightness in breathing while active.

## 2019-12-02 NOTE — PROGRESS NOTES
Subjective     Osmin Yan is a 54 year old male who presents to clinic today for the following health issues:    HPI   Asthma Follow-Up    Was ACT completed today?  Yes    ACT Total Scores 12/2/2019   ACT TOTAL SCORE (Goal Greater than or Equal to 20) 24   In the past 12 months, how many times did you visit the emergency room for your asthma without being admitted to the hospital? 1   In the past 12 months, how many times were you hospitalized overnight because of your asthma? 1       How many days per week do you miss taking your asthma controller medication?  1    Please describe any recent triggers for your asthma: None    Have you had any Emergency Room Visits, Urgent Care Visits, or Hospital Admissions since your last office visit?  No, not since last check up.  Patient states has been able to shovel driveway clear of snow without difficulty now.    Patient was advised of a1c 6.6 and high fasting glucose.  Patient denies polyuria, polydipsia, polyphagia, dizziness, LOC, HA, BOV, numbness or tingling in extremities  The 10-year ASCVD risk score (Mathewbreanna COLLIER Jr., et al., 2013) is: 11.8%    Values used to calculate the score:      Age: 54 years      Sex: Male      Is Non- : No      Diabetic: Yes      Tobacco smoker: No      Systolic Blood Pressure: 130 mmHg      Is BP treated: No      HDL Cholesterol: 44 mg/dL      Total Cholesterol: 182 mg/dL    Patient Active Problem List   Diagnosis     Lipid disorder     Cholelithiasis     Calculus of gallbladder with other cholecystitis, without mention of obstruction     CARDIOVASCULAR SCREENING; LDL GOAL LESS THAN 160     HDL deficiency     Elevated ferritin     Allergy to cats     Type 2 diabetes mellitus without complication, without long-term current use of insulin (H)     Past Surgical History:   Procedure Laterality Date     COLONOSCOPY N/A 10/27/2017    Procedure: COLONOSCOPY;  colonoscopy;  Surgeon: Robert Monzon MD;  Location: [facility]      LAPAROSCOPIC CHOLECYSTECTOMY  3/21/2012    Procedure:LAPAROSCOPIC CHOLECYSTECTOMY; Laparoscopic Cholecystectomy; Surgeon:ROSALIE PEDROZA; Location:[facility]     SURGICAL HISTORY OF -   2005    none       Social History     Tobacco Use     Smoking status: Never Smoker     Smokeless tobacco: Never Used   Substance Use Topics     Alcohol use: No     Family History   Problem Relation Age of Onset     Hypertension Mother      Sleep Apnea Mother      Lipids Father      Genitourinary Problems Father         prostate problems - getting biopsy to rule out cancer     Prostate Cancer Father      Cerebrovascular Disease Maternal Grandfather         at late 50's yo     Alzheimer Disease Paternal Grandmother         at 70's yo     Heart Disease Paternal Grandfather         MI at 81 yo     Genitourinary Problems Paternal Grandfather         enlarged prostate on post-mortem autopsy     Diabetes Brother         type 1     Hypertension Maternal Aunt      Hypertension Maternal Uncle          Current Outpatient Medications   Medication Sig Dispense Refill     atorvastatin (LIPITOR) 10 MG tablet Take 1 tablet (10 mg) by mouth daily 90 tablet 3     fluticasone (ARNUITY ELLIPTA) 50 MCG/ACT inhaler Inhale 1 puff into the lungs daily 1 each 11     albuterol (PROAIR HFA/PROVENTIL HFA/VENTOLIN HFA) 108 (90 Base) MCG/ACT inhaler Inhale 2 puffs into the lungs every 6 hours (Patient not taking: Reported on 12/2/2019) 1 Inhaler 0     fluticasone (FLONASE) 50 MCG/ACT nasal spray Spray 1 spray into both nostrils daily (Patient not taking: Reported on 12/2/2019) 16 g 11     Allergies   Allergen Reactions     Cats Shortness Of Breath     Nkda [No Known Drug Allergies]        Reviewed and updated as needed this visit by Provider  Tobacco  Allergies  Meds  Problems  Med Hx  Surg Hx  Fam Hx         Review of Systems   ROS COMP: Constitutional, HEENT, cardiovascular, pulmonary, GI, , musculoskeletal, neuro, skin, endocrine and psych  "systems are negative, except as otherwise noted.      Objective    /88   Pulse 70   Temp 97.7  F (36.5  C) (Tympanic)   Resp 16   Ht 1.759 m (5' 9.25\")   Wt 81.2 kg (179 lb)   SpO2 96%   BMI 26.24 kg/m    Body mass index is 26.24 kg/m .  Physical Exam   GENERAL: alert and no distress, ambulatory w/o assist  NECK: no tenderness, no adenopathy,  Thyroid not enlarged  RESP: lungs clear to auscultation - no rales, no rhonchi, no wheezes  CV: regular rates and rhythm, no murmur  MS: no edema  SKIN: no suspicious lesions, no rashes  NEURO: strength and tone- normal, sensory exam- grossly normal, mentation- intact, speech- normal, reflexes- symmetric  ABD:  nontender    Diagnostic Test Results:  Labs reviewed in [facility]    PFT:  The FVC, FEV1 and FEV1/FVC ratio are reduced.  TThe FVC is reduced relative to the SVC indicating air trapping.  While the TLC, FRC and SVC are within normal limits, the RV is increased.  Following administration of bronchodilators, there is a   significant response.  The diffusing capacity is normal.  IMPRESSION:  Moderate Airflow Obstruction  -Asthmatic Type  ____________________________________________M.D.    This interpretation has been electronically signed:  Otis Patel 10/29/2019  02:25:02 PM          Assessment & Plan     Osmin was seen today for asthma.    Diagnoses and all orders for this visit:    Mild persistent asthma without complication  -     fluticasone (ARNUITY ELLIPTA) 50 MCG/ACT inhaler; Inhale 1 puff into the lungs daily  Controlled now.  Continue daily maintenance inhaler.  Reinforced albuterol use.  Vaccinations UTD.  Return precautions discussed and given to patient.    Type 2 diabetes mellitus without complication, without long-term current use of insulin (H)  -     Hemoglobin A1c; Future  -     AMBULATORY ADULT DIABETES EDUCATOR REFERRAL  -     FOOT EXAM  -     atorvastatin (LIPITOR) 10 MG tablet; Take 1 tablet (10 mg) by mouth daily  Advised patient on possible " complications of condition if uncontrolled.  Currently a1c is in diabetic range but within goal range.  Discussed aggressive lifestyle changes vs low dose metofrmin as treatment.  Patient prefers lifestyle management for now.  Recheck labs in 3 months.              Patient Instructions   Start atorvastatin 10 mg daily.  You will be contacted in 1-2 business days to get a schedule for the diabetes educator.    You are diagnosed with Type 2 diabetes mellitus.  As discussed today, the following are recommended:    - Medication: deferred per our discussion today; start statin  - Carbohydrate controlled diet; see below for details  - Exercise: 150 mins of aerobic activity per week as minimum goal  - Weight management  - Diabetes educator consultation  - Check Blood sugars at home:  - Do not walk barefoot; check feet daily for sores.  - get a flu vaccine every fall  - get annual eye exam, and regular dental exams  - Repeat blood tests: Call to schedule repeat lab tests in 3 months (March 2020); make sure you are fasting for more than 12 hrs.     Continue Arnuity daily.  Albuterol inhaler 2 puffs every 4 hrs as needed for wheezing or coughing spells or tightness in breathing while active.      Return in about 3 months (around 3/2/2020) for Lab Test.    Mendoza Carter MD  [facility]

## 2019-12-02 NOTE — NURSING NOTE
"Initial /88   Pulse 70   Temp 97.7  F (36.5  C) (Tympanic)   Resp 16   Ht 1.759 m (5' 9.25\")   Wt 81.2 kg (179 lb)   SpO2 96%   BMI 26.24 kg/m   Estimated body mass index is 26.24 kg/m  as calculated from the following:    Height as of this encounter: 1.759 m (5' 9.25\").    Weight as of this encounter: 81.2 kg (179 lb). .    Lucila Bender, NANCY    "

## 2019-12-03 ASSESSMENT — ASTHMA QUESTIONNAIRES: ACT_TOTALSCORE: 24

## 2019-12-03 NOTE — TELEPHONE ENCOUNTER
Dr. Carter,  Patient's insurance does not cover Arnuity Ellipta.  They will cover  Advair  Asmanex  Dulera  Symbicort  Pulmicort    Clotilde

## 2019-12-09 PROBLEM — J45.30 MILD PERSISTENT ASTHMA WITHOUT COMPLICATION: Status: ACTIVE | Noted: 2019-12-09

## 2019-12-11 ENCOUNTER — MYC MEDICAL ADVICE (OUTPATIENT)
Dept: FAMILY MEDICINE | Facility: CLINIC | Age: 54
End: 2019-12-11

## 2019-12-11 ENCOUNTER — TELEPHONE (OUTPATIENT)
Dept: FAMILY MEDICINE | Facility: CLINIC | Age: 54
End: 2019-12-11

## 2019-12-11 DIAGNOSIS — J45.30 MILD PERSISTENT ASTHMA WITHOUT COMPLICATION: Primary | ICD-10-CM

## 2019-12-11 RX ORDER — FLUTICASONE PROPIONATE 110 UG/1
1 AEROSOL, METERED RESPIRATORY (INHALATION) 2 TIMES DAILY
Qty: 12 G | Refills: 11 | Status: SHIPPED | OUTPATIENT
Start: 2019-12-11 | End: 2022-03-02

## 2019-12-11 NOTE — TELEPHONE ENCOUNTER
Patient's Asmanex is backordered and Arnuity is too expensive. Can we try an order for Flovent? Patient is aware of asking for med change.  Thanks,   Lynsey Al  Certified Pharmacy Technician  [facility]  [phone]

## 2019-12-11 NOTE — TELEPHONE ENCOUNTER
Dr. Carter,    Please see pharmacy note.  I have pended the flovent for your apptoval. Alyssa FORBES RN

## 2019-12-14 ENCOUNTER — MYC MEDICAL ADVICE (OUTPATIENT)
Dept: FAMILY MEDICINE | Facility: CLINIC | Age: 54
End: 2019-12-14

## 2019-12-16 NOTE — TELEPHONE ENCOUNTER
They are slightly different formulations of fluticasone. Their labeling is different in terms of dosing schedules due to that.  Flovent is labeled to be used every 12 hours to control asthma.  Using it only once a day may increase risk of uncontrolled asthma.

## 2020-02-16 ENCOUNTER — HEALTH MAINTENANCE LETTER (OUTPATIENT)
Age: 55
End: 2020-02-16

## 2020-11-29 ENCOUNTER — HEALTH MAINTENANCE LETTER (OUTPATIENT)
Age: 55
End: 2020-11-29

## 2021-04-10 ENCOUNTER — HEALTH MAINTENANCE LETTER (OUTPATIENT)
Age: 56
End: 2021-04-10

## 2021-07-31 ENCOUNTER — HEALTH MAINTENANCE LETTER (OUTPATIENT)
Age: 56
End: 2021-07-31

## 2021-09-19 ENCOUNTER — HEALTH MAINTENANCE LETTER (OUTPATIENT)
Age: 56
End: 2021-09-19

## 2021-11-14 ENCOUNTER — HEALTH MAINTENANCE LETTER (OUTPATIENT)
Age: 56
End: 2021-11-14

## 2022-01-09 ENCOUNTER — HEALTH MAINTENANCE LETTER (OUTPATIENT)
Age: 57
End: 2022-01-09

## 2022-03-02 ENCOUNTER — OFFICE VISIT (OUTPATIENT)
Dept: FAMILY MEDICINE | Facility: CLINIC | Age: 57
End: 2022-03-02
Payer: COMMERCIAL

## 2022-03-02 VITALS
WEIGHT: 175.6 LBS | DIASTOLIC BLOOD PRESSURE: 86 MMHG | SYSTOLIC BLOOD PRESSURE: 134 MMHG | BODY MASS INDEX: 26.01 KG/M2 | RESPIRATION RATE: 20 BRPM | OXYGEN SATURATION: 94 % | TEMPERATURE: 96.2 F | HEIGHT: 69 IN | HEART RATE: 77 BPM

## 2022-03-02 DIAGNOSIS — J45.30 MILD PERSISTENT ASTHMA WITHOUT COMPLICATION: Primary | ICD-10-CM

## 2022-03-02 DIAGNOSIS — E78.5 HYPERLIPIDEMIA, UNSPECIFIED HYPERLIPIDEMIA TYPE: ICD-10-CM

## 2022-03-02 DIAGNOSIS — E11.9 TYPE 2 DIABETES MELLITUS WITHOUT COMPLICATION, WITHOUT LONG-TERM CURRENT USE OF INSULIN (H): ICD-10-CM

## 2022-03-02 DIAGNOSIS — B37.0 THRUSH: ICD-10-CM

## 2022-03-02 PROBLEM — J30.81 ALLERGY TO CATS: Status: RESOLVED | Noted: 2019-10-21 | Resolved: 2022-03-02

## 2022-03-02 LAB
ANION GAP SERPL CALCULATED.3IONS-SCNC: 3 MMOL/L (ref 3–14)
BUN SERPL-MCNC: 20 MG/DL (ref 7–30)
CALCIUM SERPL-MCNC: 9 MG/DL (ref 8.5–10.1)
CHLORIDE BLD-SCNC: 104 MMOL/L (ref 94–109)
CHOLEST SERPL-MCNC: 172 MG/DL
CO2 SERPL-SCNC: 30 MMOL/L (ref 20–32)
CREAT SERPL-MCNC: 1.01 MG/DL (ref 0.66–1.25)
CREAT UR-MCNC: 87 MG/DL
FASTING STATUS PATIENT QL REPORTED: NO
GFR SERPL CREATININE-BSD FRML MDRD: 86 ML/MIN/1.73M2
GLUCOSE BLD-MCNC: 275 MG/DL (ref 70–99)
HBA1C MFR BLD: 9.2 % (ref 0–5.6)
HDLC SERPL-MCNC: 35 MG/DL
LDLC SERPL CALC-MCNC: 105 MG/DL
MICROALBUMIN UR-MCNC: 6 MG/L
MICROALBUMIN/CREAT UR: 6.9 MG/G CR (ref 0–17)
NONHDLC SERPL-MCNC: 137 MG/DL
POTASSIUM BLD-SCNC: 4.1 MMOL/L (ref 3.4–5.3)
SODIUM SERPL-SCNC: 137 MMOL/L (ref 133–144)
TRIGL SERPL-MCNC: 161 MG/DL

## 2022-03-02 PROCEDURE — 99214 OFFICE O/P EST MOD 30 MIN: CPT | Performed by: PHYSICIAN ASSISTANT

## 2022-03-02 PROCEDURE — 80048 BASIC METABOLIC PNL TOTAL CA: CPT | Performed by: PHYSICIAN ASSISTANT

## 2022-03-02 PROCEDURE — 83036 HEMOGLOBIN GLYCOSYLATED A1C: CPT | Performed by: PHYSICIAN ASSISTANT

## 2022-03-02 PROCEDURE — 82043 UR ALBUMIN QUANTITATIVE: CPT | Performed by: PHYSICIAN ASSISTANT

## 2022-03-02 PROCEDURE — 80061 LIPID PANEL: CPT | Performed by: PHYSICIAN ASSISTANT

## 2022-03-02 PROCEDURE — 36415 COLL VENOUS BLD VENIPUNCTURE: CPT | Performed by: PHYSICIAN ASSISTANT

## 2022-03-02 RX ORDER — FLUTICASONE PROPIONATE AND SALMETEROL XINAFOATE 230; 21 UG/1; UG/1
1 AEROSOL, METERED RESPIRATORY (INHALATION) 2 TIMES DAILY
Qty: 12 G | Refills: 3 | Status: SHIPPED | OUTPATIENT
Start: 2022-03-02 | End: 2023-01-16

## 2022-03-02 RX ORDER — NYSTATIN 100000/ML
500000 SUSPENSION, ORAL (FINAL DOSE FORM) ORAL 3 TIMES DAILY
Qty: 400 ML | Refills: 0 | Status: SHIPPED | OUTPATIENT
Start: 2022-03-02 | End: 2022-04-18

## 2022-03-02 RX ORDER — ALBUTEROL SULFATE 90 UG/1
2 AEROSOL, METERED RESPIRATORY (INHALATION) EVERY 6 HOURS
Qty: 18 G | Refills: 2 | Status: SHIPPED | OUTPATIENT
Start: 2022-03-02 | End: 2023-04-25

## 2022-03-02 ASSESSMENT — ASTHMA QUESTIONNAIRES: ACT_TOTALSCORE: 11

## 2022-03-02 NOTE — PATIENT INSTRUCTIONS
Stop Flovent. Start Advair.     Use Nystatin for thrush.     We will get some labs for Dr. Carter for your diabetes visit in 1 month.

## 2022-03-02 NOTE — PROGRESS NOTES
Assessment & Plan   Mild persistent asthma without complication  Patient presents for worsening shortness of breath and recurrent thrush while using his fluticasone inhaler.  Patient's ACT score is 11 and notes that his asthma is significantly worse with air quality changes.  Given his symptoms and poor ACT score we will change his medicine to Advair for improved control and also refill of the butyryl inhaler.  Recommend follow-up in 1-2 months with his PCP for recheck of his asthma.  - fluticasone-salmeterol (ADVAIR-HFA) 230-21 MCG/ACT inhaler; Inhale 1 puff into the lungs 2 times daily  - nystatin (MYCOSTATIN) 576041 UNIT/ML suspension; Swish and spit 5 mLs (500,000 Units) in mouth 3 times daily    Thrush  Recurrent.  Suspect that this is somewhat related to his inhaled corticosteroid but also due to his likely uncontrolled diabetes.  Patient was last seen 2 years ago for his diabetes which was very mild at 6.6.  Since that time he has made some lifestyle changes like stopping drinking soda or other sorts of sugary beverages.  I do suspect that his diabetes is not as well-controlled as he thinks and would significantly benefit from treatment of his diabetes.  He would like to wait to see his PCP for this and has an appointment in 1 month.  In the meantime I prescribed nystatin swish and spit for improvement of his symptoms.  - nystatin (MYCOSTATIN) 792167 UNIT/ML suspension; Swish and spit 5 mLs (500,000 Units) in mouth 3 times daily    Type 2 diabetes mellitus without complication, without long-term current use of insulin (H)  First diagnosed 2 years ago and has not been seen since.  A1c at that time was 6.6%.  He notes that he is change his lifestyle slightly and not eating or drinking sugary foods as much her beverages.  He does have recurrent thrush and I do suspect that his diabetes is likely uncontrolled.  We will recheck these labs in advance of his PCP appointment in 1 month.  We will check an A1c albumin  "creatinine ratio and a BMP so that his PCP can make the appropriate decisions at his next visit.  - Albumin Random Urine Quantitative with Creat Ratio; Future  - HEMOGLOBIN A1C; Future  - BASIC METABOLIC PANEL; Future    Hyperlipidemia, unspecified hyperlipidemia type  Was on Lipitor in the past but is no longer taking this medicine.  Recheck lipid panel for his follow-up appointment in 1 month with his PCP.  - Lipid panel reflex to direct LDL Fasting; Future     BMI:   Estimated body mass index is 25.74 kg/m  as calculated from the following:    Height as of this encounter: 1.759 m (5' 9.25\").    Weight as of this encounter: 79.7 kg (175 lb 9.6 oz).     Return in about 4 weeks (around 3/30/2022) for In-Clinic Visit, Diabetes check.    EMILEE [facility]    Pieter Grimaldo is a 56 year old who presents for the following health issues     History of Present Illness     Asthma:  He presents for follow up of asthma.  He has no cough, some wheezing, and some shortness of breath. He is using a relief medication a few times a week. He typically misses taking his controller medication 1 time(s) per week.Patient is aware of the following triggers: animal dander, cold air, exercise or sports and upper respiratory infections. The patient has not had a visit to the Emergency Room, Urgent Care or Hospital due to asthma since the last clinic visit.   Reason for visit:  Albuterol refill and thrush    He eats 2-3 servings of fruits and vegetables daily.He consumes 2 sweetened beverage(s) daily.He exercises with enough effort to increase his heart rate 10 to 19 minutes per day.  He exercises with enough effort to increase his heart rate 3 or less days per week. He is missing 1 dose(s) of medications per week.     Concern - Thrush   Onset: months off and on   Description: Says that it is okay right now but thinks that he has been getting thrush from his inhaler   Intensity: " "mild  Progression of Symptoms:  improving  Accompanying Signs & Symptoms: burning and numbness   Previous history of similar problem: na   Precipitating factors:        Worsened by: na   Alleviating factors:        Improved by: na   Therapies tried and outcome: salt water    Review of Systems   See HPI       Objective    /86 (BP Location: Right arm, Patient Position: Sitting, Cuff Size: Adult Large)   Pulse 77   Temp (!) 96.2  F (35.7  C) (Tympanic)   Resp 20   Ht 1.759 m (5' 9.25\")   Wt 79.7 kg (175 lb 9.6 oz)   SpO2 94%   BMI 25.74 kg/m    Body mass index is 25.74 kg/m .  Physical Exam   Constitutional: healthy, alert, and no distress  Head: Normocephalic. Atraumatic  Eyes: No conjunctival injection, sclera anicteric  Mouth: Small white appearing lesion on the tongue.  No erythema of the tongue.  No ulcerations. Mucous membranes are moist.  Cardiovascular: RRR. No murmurs, clicks, gallops, or rubs. No peripheral edema.   Respiratory: No resp distress. Lungs CTAB bilaterally.   Musculoskeletal: extremities normal- no gross deformities noted, and normal muscle tone  Skin: no suspicious lesions or rashes  Neurologic: Gait normal. CN 2-12 grossly intact  Psychiatric: mentation appears normal and affect normal/bright    Physician Attestation   I, Indra Sanford, was present with the medical/ARNOLD student who participated in the service and in the documentation of the note.  I have verified the history and personally performed the physical exam and medical decision making.  I agree with the assessment and plan of care as documented in the note.      Indra Sanford PA-C        "

## 2022-03-28 ENCOUNTER — OFFICE VISIT (OUTPATIENT)
Dept: FAMILY MEDICINE | Facility: CLINIC | Age: 57
End: 2022-03-28
Payer: COMMERCIAL

## 2022-03-28 VITALS
WEIGHT: 177.2 LBS | DIASTOLIC BLOOD PRESSURE: 90 MMHG | SYSTOLIC BLOOD PRESSURE: 140 MMHG | HEART RATE: 78 BPM | RESPIRATION RATE: 14 BRPM | BODY MASS INDEX: 26.25 KG/M2 | TEMPERATURE: 97.4 F | HEIGHT: 69 IN | OXYGEN SATURATION: 95 %

## 2022-03-28 DIAGNOSIS — L71.9 ROSACEA: ICD-10-CM

## 2022-03-28 DIAGNOSIS — Z00.00 ROUTINE GENERAL MEDICAL EXAMINATION AT A HEALTH CARE FACILITY: Primary | ICD-10-CM

## 2022-03-28 DIAGNOSIS — E11.65 UNCONTROLLED TYPE 2 DIABETES MELLITUS WITH HYPERGLYCEMIA (H): ICD-10-CM

## 2022-03-28 DIAGNOSIS — Z11.4 SCREENING FOR HIV (HUMAN IMMUNODEFICIENCY VIRUS): ICD-10-CM

## 2022-03-28 PROCEDURE — 99207 PR FOOT EXAM NO CHARGE: CPT | Mod: 25 | Performed by: FAMILY MEDICINE

## 2022-03-28 PROCEDURE — 99396 PREV VISIT EST AGE 40-64: CPT | Performed by: FAMILY MEDICINE

## 2022-03-28 PROCEDURE — 99214 OFFICE O/P EST MOD 30 MIN: CPT | Mod: 25 | Performed by: FAMILY MEDICINE

## 2022-03-28 RX ORDER — GLUCOSAMINE HCL/CHONDROITIN SU 500-400 MG
CAPSULE ORAL
Qty: 100 EACH | Refills: 3 | Status: SHIPPED | OUTPATIENT
Start: 2022-03-28

## 2022-03-28 RX ORDER — ROSUVASTATIN CALCIUM 20 MG/1
20 TABLET, COATED ORAL DAILY
Qty: 90 TABLET | Refills: 3 | Status: SHIPPED | OUTPATIENT
Start: 2022-03-28 | End: 2023-04-25

## 2022-03-28 RX ORDER — LISINOPRIL 2.5 MG/1
2.5 TABLET ORAL DAILY
Qty: 90 TABLET | Refills: 3 | Status: SHIPPED | OUTPATIENT
Start: 2022-03-28 | End: 2023-04-25

## 2022-03-28 RX ORDER — METRONIDAZOLE 7.5 MG/G
GEL TOPICAL 2 TIMES DAILY
Qty: 45 G | Refills: 1 | Status: SHIPPED | OUTPATIENT
Start: 2022-03-28 | End: 2024-05-07

## 2022-03-28 RX ORDER — LANCETS
EACH MISCELLANEOUS
Qty: 200 EACH | Refills: 6 | Status: SHIPPED | OUTPATIENT
Start: 2022-03-28

## 2022-03-28 ASSESSMENT — ENCOUNTER SYMPTOMS
COUGH: 0
ABDOMINAL PAIN: 0
NERVOUS/ANXIOUS: 0
WEAKNESS: 0
PARESTHESIAS: 0
CHILLS: 0
CONSTIPATION: 0
DIARRHEA: 0
HEMATOCHEZIA: 0
FREQUENCY: 0
JOINT SWELLING: 0
NAUSEA: 0
ARTHRALGIAS: 0
MYALGIAS: 0
FEVER: 0
EYE PAIN: 0
PALPITATIONS: 0
HEARTBURN: 0
DYSURIA: 0
DIZZINESS: 0
HEADACHES: 0
HEMATURIA: 0
SHORTNESS OF BREATH: 1
SORE THROAT: 0

## 2022-03-28 ASSESSMENT — ASTHMA QUESTIONNAIRES: ACT_TOTALSCORE: 25

## 2022-03-28 ASSESSMENT — PAIN SCALES - GENERAL: PAINLEVEL: NO PAIN (0)

## 2022-03-28 NOTE — PROGRESS NOTES
SUBJECTIVE:   CC: Osmin Yan is an 56 year old male who presents for preventative health visit.       Patient has been advised of split billing requirements and indicates understanding: Yes  Healthy Habits:     Getting at least 3 servings of Calcium per day:  Yes    Bi-annual eye exam:  Yes    Dental care twice a year:  NO    Sleep apnea or symptoms of sleep apnea:  Excessive snoring    Diet:  Carbohydrate counting    Frequency of exercise:  2-3 days/week    Duration of exercise:  Less than 15 minutes    Taking medications regularly:  Yes    Medication side effects:  Other    PHQ-2 Total Score: 0    Additional concerns today:  Yes    Asthma and diabetes.  Acne on forehead and cheeks.    Diabetes Follow-up      How often are you checking your blood sugar? Not at all    What concerns do you have today about your diabetes? Other: uncontrolled condition now - was lost to follow up     Do you have any of these symptoms? (Select all that apply)  No numbness or tingling in feet.  No redness, sores or blisters on feet.  No complaints of excessive thirst.  No reports of blurry vision.  No significant changes to weight.    Have you had a diabetic eye exam in the last 12 months? No        BP Readings from Last 2 Encounters:   03/28/22 (!) 140/90   03/02/22 134/86     Hemoglobin A1C POCT (%)   Date Value   10/29/2019 6.6 (H)   03/01/2012 5.3     Hemoglobin A1C (%)   Date Value   03/02/2022 9.2 (H)     LDL Cholesterol Calculated (mg/dL)   Date Value   03/02/2022 105 (H)   10/29/2019 107 (H)   03/12/2014 86       Asthma Follow-Up    Was ACT completed today?    Yes    ACT Total Scores 3/28/2022   ACT TOTAL SCORE (Goal Greater than or Equal to 20) 25   In the past 12 months, how many times did you visit the emergency room for your asthma without being admitted to the hospital? 0   In the past 12 months, how many times were you hospitalized overnight because of your asthma? 1          How many days per week do you miss taking  your asthma controller medication?  0    Please describe any recent triggers for your asthma: smoke    Have you had any Emergency Room Visits, Urgent Care Visits, or Hospital Admissions since your last office visit?  No      Today's PHQ-2 Score:   PHQ-2 ( 1999 [employer]) 3/28/2022   Q1: Little interest or pleasure in doing things 0   Q2: Feeling down, depressed or hopeless 0   PHQ-2 Score 0   PHQ-2 Total Score (12-17 Years)- Positive if 3 or more points; Administer PHQ-A if positive -   Q1: Little interest or pleasure in doing things Not at all   Q2: Feeling down, depressed or hopeless Not at all   PHQ-2 Score 0     Since he started advair, asthma has bene in better control, and has been feeling better.    Abuse: Current or Past(Physical, Sexual or Emotional)- No  Do you feel safe in your environment? Yes      Social History     Tobacco Use     Smoking status: Never Smoker     Smokeless tobacco: Never Used   Substance Use Topics     Alcohol use: Yes     Comment: rare     If you drink alcohol do you typically have >3 drinks per day or >7 drinks per week? No    Alcohol Use 3/28/2022   Prescreen: >3 drinks/day or >7 drinks/week? Not Applicable   Prescreen: >3 drinks/day or >7 drinks/week? -       Last PSA:   PSA   Date Value Ref Range Status   10/29/2019 1.14 0 - 4 ug/L Final     Comment:     Assay Method:  Chemiluminescence using Siemens Vista analyzer       Reviewed orders with patient. Reviewed health maintenance and updated orders accordingly - Yes  Patient Active Problem List   Diagnosis     Hyperlipidemia     Type 2 diabetes mellitus without complication, without long-term current use of insulin (H)     Mild persistent asthma without complication     Past Surgical History:   Procedure Laterality Date     COLONOSCOPY N/A 10/27/2017    Procedure: COLONOSCOPY;  colonoscopy;  Surgeon: Robert Monzon MD;  Location: [facility]     LAPAROSCOPIC CHOLECYSTECTOMY  3/21/2012    Procedure:LAPAROSCOPIC CHOLECYSTECTOMY;  Laparoscopic Cholecystectomy; Surgeon:ROSALIE PEDROZA; Location:[facility]     SURGICAL HISTORY OF -   2005    none       Social History     Tobacco Use     Smoking status: Never Smoker     Smokeless tobacco: Never Used   Substance Use Topics     Alcohol use: Yes     Comment: rare     Family History   Problem Relation Age of Onset     Hypertension Mother      Sleep Apnea Mother      Obesity Mother      Thyroid nodules Mother      Lipids Father      Genitourinary Problems Father         prostate problems - getting biopsy to rule out cancer     Prostate Cancer Father      Esophageal Cancer Father      Other Cancer Father      Cerebrovascular Disease Maternal Grandfather         at late 50's yo     Alzheimer Disease Paternal Grandmother         at 70's yo     Heart Disease Paternal Grandfather         MI at 79 yo     Genitourinary Problems Paternal Grandfather         enlarged prostate on post-mortem autopsy     Diabetes Brother         type 1     Thyroid nodules Brother      Hypertension Maternal Aunt      Hypertension Maternal Uncle          Current Outpatient Medications   Medication Sig Dispense Refill     alcohol swab prep pads Use to swab area of injection/luis carlos as directed. 100 each 3     blood glucose (NO BRAND SPECIFIED) test strip Use to test blood sugar two times daily or as directed. To accompany: Blood Glucose Monitor Brands: per insurance. 100 strip 6     blood glucose monitoring (NO BRAND SPECIFIED) meter device kit Use to test blood sugar twice times daily or as directed. Preferred blood glucose meter OR supplies to accompany: Blood Glucose Monitor Brands: per insurance. 1 kit 0     fluticasone-salmeterol (ADVAIR-HFA) 230-21 MCG/ACT inhaler Inhale 1 puff into the lungs 2 times daily 12 g 3     lisinopril (ZESTRIL) 2.5 MG tablet Take 1 tablet (2.5 mg) by mouth daily 90 tablet 3     metFORMIN (GLUCOPHAGE) 500 MG tablet Take 1 tablet (500 mg) by mouth 2 times daily (with meals) 180 tablet 0      metroNIDAZOLE (METROGEL) 0.75 % external gel Apply topically 2 times daily 45 g 1     rosuvastatin (CRESTOR) 20 MG tablet Take 1 tablet (20 mg) by mouth daily 90 tablet 3     thin (NO BRAND SPECIFIED) lancets Use with lanceting device. To accompany: Blood Glucose Monitor Brands: per insurance. 200 each 6     albuterol (PROAIR HFA/PROVENTIL HFA/VENTOLIN HFA) 108 (90 Base) MCG/ACT inhaler Inhale 2 puffs into the lungs every 6 hours (Patient not taking: Reported on 3/28/2022) 18 g 2     nystatin (MYCOSTATIN) 869468 UNIT/ML suspension Swish and spit 5 mLs (500,000 Units) in mouth 3 times daily (Patient not taking: Reported on 3/28/2022) 400 mL 0     Allergies   Allergen Reactions     Cats Shortness Of Breath     Nkda [No Known Drug Allergies]        Reviewed and updated as needed this visit by clinical staff   Tobacco  Allergies  Meds   Med Hx  Surg Hx  Fam Hx  Soc Hx        Reviewed and updated as needed this visit by Provider   Tobacco   Meds             Past Medical History:   Diagnosis Date     Calculus of gallbladder with other cholecystitis, without mention of obstruction 3/15/2012     Cholelithiasis 3/12/2012    IMO update changed this record. Please review for accuracy     Diabetes (H) Dec 2019     NO ACTIVE PROBLEMS       Past Surgical History:   Procedure Laterality Date     COLONOSCOPY N/A 10/27/2017    Procedure: COLONOSCOPY;  colonoscopy;  Surgeon: Robert Monzon MD;  Location: [facility]     LAPAROSCOPIC CHOLECYSTECTOMY  3/21/2012    Procedure:LAPAROSCOPIC CHOLECYSTECTOMY; Laparoscopic Cholecystectomy; Surgeon:ROSALIE PEDROZA; Location:[facility]     SURGICAL HISTORY OF -   2005    none       Review of Systems   Constitutional: Negative for chills and fever.   HENT: Positive for congestion. Negative for ear pain, hearing loss and sore throat.    Eyes: Negative for pain and visual disturbance.   Respiratory: Positive for shortness of breath. Negative for cough.    Cardiovascular: Negative  "for chest pain, palpitations and peripheral edema.   Gastrointestinal: Negative for abdominal pain, constipation, diarrhea, heartburn, hematochezia and nausea.   Genitourinary: Positive for impotence. Negative for dysuria, frequency, genital sores, hematuria, penile discharge and urgency.   Musculoskeletal: Negative for arthralgias, joint swelling and myalgias.   Skin: Negative for rash.   Neurological: Negative for dizziness, weakness, headaches and paresthesias.   Psychiatric/Behavioral: Negative for mood changes. The patient is not nervous/anxious.        OBJECTIVE:   BP (!) 140/90   Pulse 78   Temp 97.4  F (36.3  C) (Tympanic)   Resp 14   Ht 1.753 m (5' 9\")   Wt 80.4 kg (177 lb 3.2 oz)   SpO2 95%   BMI 26.17 kg/m      Physical Exam  GENERAL:  alert and no distress, ambulatory w/o assist  EYES: no icterus; pink conjunctivae.  NECK: no tenderness, no adenopathy,  Thyroid not enlarged  RESP: lungs clear to auscultation - no rales, no rhonchi, no wheezes  CV: regular rates and rhythm, no murmur    SKIN: no suspicious lesions, no rashes    ABD:  nontender  FOOT EXAM: no ulcer/erythema; pedal pulses strong bilaterally; monofilament: no deficit either foot    RECTAL: deferred  MS: no musculoskeletal defects are noted and gait is age appropriate without ataxia  SKIN: no suspicious lesions or rashes  NEURO: Normal strength and tone, sensory exam grossly normal, mentation intact and speech normal  Diagnostic Test Results:  Labs reviewed in Epic    ASSESSMENT/PLAN:   Osmin was seen today for physical.    Diagnoses and all orders for this visit:    Routine general medical examination at a health care facility    Uncontrolled type 2 diabetes mellitus with hyperglycemia (H)  -     lisinopril (ZESTRIL) 2.5 MG tablet; Take 1 tablet (2.5 mg) by mouth daily  -     metFORMIN (GLUCOPHAGE) 500 MG tablet; Take 1 tablet (500 mg) by mouth 2 times daily (with meals)  -     rosuvastatin (CRESTOR) 20 MG tablet; Take 1 tablet (20 " mg) by mouth daily  -     Hemoglobin A1c; Future  -     Basic metabolic panel; Future  -     AMB Adult Diabetes Educator Referral; Future  -     Adult Dermatology Referral; Future  -     blood glucose monitoring (NO BRAND SPECIFIED) meter device kit; Use to test blood sugar twice times daily or as directed. Preferred blood glucose meter OR supplies to accompany: Blood Glucose Monitor Brands: per insurance.  -     blood glucose (NO BRAND SPECIFIED) test strip; Use to test blood sugar two times daily or as directed. To accompany: Blood Glucose Monitor Brands: per insurance.  -     thin (NO BRAND SPECIFIED) lancets; Use with lanceting device. To accompany: Blood Glucose Monitor Brands: per insurance.  -     alcohol swab prep pads; Use to swab area of injection/luis carlos as directed.  -     FOOT EXAM  -     OFFICE/OUTPT VISIT,EST,LEVL IV  Patient has been lost to follow up for more than 2 years.  Uncontrolled DM developed in spite of lifestyle modifications.  Discussed  nature, course, treatment, monitoring, lifestyle changes, and preventative recommendations for diabetics.  Daily foot care, flu vaccine every year, annual eye exam.  Patient deferred pneumovax to next visit.  Patient concurred to start metformin.  Titrate to 2 x a day if tolerated after 2 weeks and depending on glucose measurements.  Check BGs BID.  Patient concurred with DM education referral after discussing its value to diabetics.    Rosacea  -     metroNIDAZOLE (METROGEL) 0.75 % external gel; Apply topically 2 times daily  -     OFFICE/OUTPT VISIT,EST,LEVL IV  Exam is consistent with rosacea.  Restart metrogel topically.  Establish care with dermatology clinic.    Screening for HIV (human immunodeficiency virus)  Patient declined screening.    Other orders  -     REVIEW OF HEALTH MAINTENANCE PROTOCOL ORDERS        Patient has been advised of split billing requirements and indicates understanding: Yes    COUNSELING:   Reviewed preventive health  "counseling, as reflected in patient instructions    Estimated body mass index is 26.17 kg/m  as calculated from the following:    Height as of this encounter: 1.753 m (5' 9\").    Weight as of this encounter: 80.4 kg (177 lb 3.2 oz).     Weight management plan: Discussed healthy diet and exercise guidelines    He reports that he has never smoked. He has never used smokeless tobacco.      Counseling Resources:  ATP IV Guidelines  Pooled Cohorts Equation Calculator  FRAX Risk Assessment  ICSI Preventive Guidelines  Dietary Guidelines for Americans, 2010  USDA's MyPlate  ASA Prophylaxis  Lung CA Screening    Mendoza Carter MD  [facility]  "

## 2022-03-28 NOTE — PATIENT INSTRUCTIONS
Start metformin 500 mg once a day with breakfast for 2 weeks, then increase to 2 x a day if well-tolerated.  Stat rosuvastatin and lisinopril.    Start metrogel for rosacea.  Schedule dermatology consult.    Be consistent with low salt, low trans fat and low saturated fat diet.  Eat food rich in omega-3-fatty acids as you tolerate. (salmon, olive oil)  Eat 5 cups of vegetables, fruits and whole grains per day.  Limit starchy food (white rice, white bread, white pasta, white potatoes) to less than a cup per meal.  Minimize sweets, junk food and fastfood. Limit soda beverages to one serving per day; best to avoid it altogether though.    Exercise: moderate intensity sustained for at least 30 mins per episode, goal of 150 mins per week at least  Combine cardiovascular and resistance exercises.  These exercise recommendations are in addition to your daily activity at work or home.  Work on losing weight.    Regular foot care; don't walk barefoot  Annual eye exam.  Please request your eye doctor to furnish a copy of the eye exam report to the clinic to be placed in your record.  Flu vaccine by the end of October yearly.  Be sure to update any other recommended vaccinations for diabetics.    You deferred pneumonia vaccine until next visit.    You may get the Shingrix vaccine for shingles if you desire, and after you verify with insurance how they cover the vaccine.    Blood tests: repeat BMP in 1 month, repeat A1c in 3 months.    Preventative Care Visits include: Yearly physicals, Well-child visits, Welcome to Medicare visits, & Medicare yearly wellness exams.    The purpose of these visits is to discuss your medical history and prevent health problems before you are sick.  You may need to pay a copay, coinsurance or deductible if your visit today includes testing or treating for a new or existing condition.    Additional charges may be incurred for today's visit. If you have questions about what your insurance plan  covers, please contact your Insurance Company's member service department.  If you have questions specific to a bill you have already received from [employer] [employer], please contact the [facility] Billing Office at [phone]. \    Preventive Health Recommendations  Male Ages 50 - 64    Yearly exam:             See your health care provider every year in order to  o   Review health changes.   o   Discuss preventive care.    o   Review your medicines if your doctor has prescribed any.     Have a cholesterol test every 5 years, or more frequently if you are at risk for high cholesterol/heart disease.     Have a diabetes test (fasting glucose) every three years. If you are at risk for diabetes, you should have this test more often.     Have a colonoscopy at age 50, or have a yearly FIT test (stool test). These exams will check for colon cancer.      Talk with your health care provider about whether or not a prostate cancer screening test (PSA) is right for you.    You should be tested each year for STDs (sexually transmitted diseases), if you re at risk.     Shots: Get a flu shot each year. Get a tetanus shot every 10 years.     Nutrition:    Eat at least 5 servings of fruits and vegetables daily.     Eat whole-grain bread, whole-wheat pasta and brown rice instead of white grains and rice.     Get adequate Calcium and Vitamin D.     Lifestyle    Exercise for at least 150 minutes a week (30 minutes a day, 5 days a week). This will help you control your weight and prevent disease.     Limit alcohol to one drink per day.     No smoking.     Wear sunscreen to prevent skin cancer.     See your dentist every six months for an exam and cleaning.     See your eye doctor every 1 to 2 years.

## 2022-03-30 ENCOUNTER — TELEPHONE (OUTPATIENT)
Dept: FAMILY MEDICINE | Facility: CLINIC | Age: 57
End: 2022-03-30
Payer: COMMERCIAL

## 2022-03-30 NOTE — TELEPHONE ENCOUNTER
Diabetes Education Scheduling Outreach #1:    Call to patient to schedule. Left message with phone number to call to schedule.    Plan for 2nd outreach attempt within 1 week.    China Hernandez  [facility]  Diabetes and Nutrition Scheduling

## 2022-03-31 ENCOUNTER — MYC MEDICAL ADVICE (OUTPATIENT)
Dept: FAMILY MEDICINE | Facility: CLINIC | Age: 57
End: 2022-03-31

## 2022-04-04 NOTE — TELEPHONE ENCOUNTER
Diabetes Education Scheduling Outreach #2:    [employer] message sent to patient requesting to call to schedule.    [facility]  Diabetes and Nutrition Scheduling

## 2022-04-17 ENCOUNTER — MYC MEDICAL ADVICE (OUTPATIENT)
Dept: OTHER | Age: 57
End: 2022-04-17

## 2022-04-18 ENCOUNTER — OFFICE VISIT (OUTPATIENT)
Dept: FAMILY MEDICINE | Facility: CLINIC | Age: 57
End: 2022-04-18
Payer: COMMERCIAL

## 2022-04-18 VITALS
SYSTOLIC BLOOD PRESSURE: 134 MMHG | TEMPERATURE: 97.2 F | RESPIRATION RATE: 16 BRPM | OXYGEN SATURATION: 95 % | HEART RATE: 65 BPM | DIASTOLIC BLOOD PRESSURE: 84 MMHG | BODY MASS INDEX: 26.01 KG/M2 | WEIGHT: 175.6 LBS | HEIGHT: 69 IN

## 2022-04-18 DIAGNOSIS — Z12.5 SCREENING FOR PROSTATE CANCER: ICD-10-CM

## 2022-04-18 DIAGNOSIS — E11.65 UNCONTROLLED TYPE 2 DIABETES MELLITUS WITH HYPERGLYCEMIA (H): Primary | ICD-10-CM

## 2022-04-18 PROCEDURE — 99213 OFFICE O/P EST LOW 20 MIN: CPT | Performed by: FAMILY MEDICINE

## 2022-04-18 ASSESSMENT — PAIN SCALES - GENERAL: PAINLEVEL: NO PAIN (0)

## 2022-04-18 NOTE — PROGRESS NOTES
Assessment & Plan     Uncontrolled type 2 diabetes mellitus with hyperglycemia (H)  Reviewed with patient his meds - now at metformin 500 mg BID.  Reviewed with patient his glucometer; since the increase, BGs have been under 200 except for 2 ooccasions in the last 10 days.  Keep all meds unchanged.  Reinforced carbohydrate control.  Regular exercise.  Regular foot care, annual eye exam.  Flu vaccine every year.    Screening for prostate cancer  Patient requested to add this on next lab.  - Prostate Specific Antigen Screen       Patient Instructions   Be consistent with low salt, low trans fat and low saturated fat diet.  Eat food rich in omega-3-fatty acids as you tolerate. (salmon, olive oil)  Eat 5 cups of vegetables, fruits and whole grains per day.  Limit starchy food (white rice, white bread, white pasta, white potatoes) to less than a cup per meal.  Minimize sweets, junk food and fastfood. Limit soda beverages to one serving per day; best to avoid it altogether though.    Exercise: moderate intensity sustained for at least 30 mins per episode, goal of 150 mins per week at least  Combine cardiovascular and resistance exercises.  These exercise recommendations are in addition to your daily activity at work or home.  Work on losing weight.    Regular foot care; don't walk barefoot  Annual eye exam.  Please request your eye doctor to furnish a copy of the eye exam report to the clinic to be placed in your record.  Flu vaccine by the end of October yearly.  Be sure to update any other recommended vaccinations for diabetics.    Blood tests: schedule lab appointment first week of June 2022.    You may check blood sugars before breakfast and 1-2 hours after dinner.           Return in about 6 weeks (around 6/1/2022) for Laboratory apppointment.    Mendoza Carter MD  [facility] is a 56 year old who presents for the following health issues  Chief Complaint   Patient  presents with     Diabetes     Pt here for a follow up on diabetes.  He was started on metformin a couple weeks ago.       History of Present Illness       Diabetes:   He presents for follow up of diabetes.  He is checking home blood glucose two times daily. He checks blood glucose before meals and at bedtime.  Blood glucose is sometimes over 200 and never under 70. When his blood glucose is low, the patient is asymptomatic for confusion, blurred vision, lethargy and reports not feeling dizzy, shaky, or weak.  He has no concerns regarding his diabetes at this time.  He is not experiencing numbness or burning in feet, excessive thirst, blurry vision, weight changes or redness, sores or blisters on feet. The patient has not had a diabetic eye exam in the last 12 months.         He eats 2-3 servings of fruits and vegetables daily.He consumes 0 sweetened beverage(s) daily.He exercises with enough effort to increase his heart rate 20 to 29 minutes per day.  He exercises with enough effort to increase his heart rate 6 days per week.   He is taking medications regularly.       Diabetes Follow-up    How often are you checking your blood sugar? Two times daily  Blood sugar testing frequency justification:  Uncontrolled diabetes  What time of day are you checking your blood sugars (select all that apply)?  Before meals and At bedtime  Have you had any blood sugars above 200?  Yes 226 due to fast food  Have you had any blood sugars below 70?  No    What symptoms do you notice when your blood sugar is low?  None    What concerns do you have today about your diabetes? None     Do you have any of these symptoms? (Select all that apply)  No numbness or tingling in feet.  No redness, sores or blisters on feet.  No complaints of excessive thirst.  No reports of blurry vision.  No significant changes to weight.    Have you had a diabetic eye exam in the last 12 months? No        BP Readings from Last 2 Encounters:   04/18/22 134/84  "  03/28/22 (!) 140/90     Hemoglobin A1C POCT (%)   Date Value   10/29/2019 6.6 (H)   03/01/2012 5.3     Hemoglobin A1C (%)   Date Value   03/02/2022 9.2 (H)     LDL Cholesterol Calculated (mg/dL)   Date Value   03/02/2022 105 (H)   10/29/2019 107 (H)   03/12/2014 86         Review of Systems   Constitutional, HEENT, cardiovascular, pulmonary, GI, , musculoskeletal, neuro, skin, endocrine and psych systems are negative, except as otherwise noted.      Objective    /84   Pulse 65   Temp 97.2  F (36.2  C) (Tympanic)   Resp 16   Ht 1.753 m (5' 9\")   Wt 79.7 kg (175 lb 9.6 oz)   SpO2 95%   BMI 25.93 kg/m    Body mass index is 25.93 kg/m .  Physical Exam   GEN: alert, oriented x 3, NAD  SKIN: no jaundice/rash  PSYCH: normal mood, appropriate affect, linear thought process.  Rest of exam deferred as patient came in for mainly review of start of treatment after he had a full physical exam 2 wks ago.    Office Visit on 03/02/2022   Component Date Value Ref Range Status     Cholesterol 03/02/2022 172  <200 mg/dL Final     Triglycerides 03/02/2022 161 (A) <150 mg/dL Final     Direct Measure HDL 03/02/2022 35 (A) >=40 mg/dL Final     LDL Cholesterol Calculated 03/02/2022 105 (A) <=100 mg/dL Final     Non HDL Cholesterol 03/02/2022 137 (A) <130 mg/dL Final     Patient Fasting > 8hrs? 03/02/2022 No   Final     Sodium 03/02/2022 137  133 - 144 mmol/L Final     Potassium 03/02/2022 4.1  3.4 - 5.3 mmol/L Final     Chloride 03/02/2022 104  94 - 109 mmol/L Final     Carbon Dioxide (CO2) 03/02/2022 30  20 - 32 mmol/L Final     Anion Gap 03/02/2022 3  3 - 14 mmol/L Final     Urea Nitrogen 03/02/2022 20  7 - 30 mg/dL Final     Creatinine 03/02/2022 1.01  0.66 - 1.25 mg/dL Final     Calcium 03/02/2022 9.0  8.5 - 10.1 mg/dL Final     Glucose 03/02/2022 275 (A) 70 - 99 mg/dL Final     GFR Estimate 03/02/2022 86  >60 mL/min/1.73m2 Final    Effective December 21, 2021 eGFRcr in adults is calculated using the 2021 CKD-EPI " creatinine equation which includes age and gender (Vidya armendariz al., NEJM, DOI: 10.1056/KGNBla8816881)     Hemoglobin A1C 03/02/2022 9.2 (A) 0.0 - 5.6 % Final    Normal <5.7%   Prediabetes 5.7-6.4%    Diabetes 6.5% or higher     Note: Adopted from ADA consensus guidelines.     Creatinine Urine mg/dL 03/02/2022 87  mg/dL Final     Albumin Urine mg/L 03/02/2022 6  mg/L Final     Albumin Urine mg/g Cr 03/02/2022 6.90  0.00 - 17.00 mg/g Cr Final

## 2022-04-18 NOTE — PATIENT INSTRUCTIONS
Be consistent with low salt, low trans fat and low saturated fat diet.  Eat food rich in omega-3-fatty acids as you tolerate. (salmon, olive oil)  Eat 5 cups of vegetables, fruits and whole grains per day.  Limit starchy food (white rice, white bread, white pasta, white potatoes) to less than a cup per meal.  Minimize sweets, junk food and fastfood. Limit soda beverages to one serving per day; best to avoid it altogether though.    Exercise: moderate intensity sustained for at least 30 mins per episode, goal of 150 mins per week at least  Combine cardiovascular and resistance exercises.  These exercise recommendations are in addition to your daily activity at work or home.  Work on losing weight.    Regular foot care; don't walk barefoot  Annual eye exam.  Please request your eye doctor to furnish a copy of the eye exam report to the clinic to be placed in your record.  Flu vaccine by the end of October yearly.  Be sure to update any other recommended vaccinations for diabetics.    Blood tests: schedule lab appointment first week of June 2022.    You may check blood sugars before breakfast and 1-2 hours after dinner.

## 2022-06-20 ENCOUNTER — LAB (OUTPATIENT)
Dept: LAB | Facility: CLINIC | Age: 57
End: 2022-06-20
Payer: COMMERCIAL

## 2022-06-20 ENCOUNTER — MYC MEDICAL ADVICE (OUTPATIENT)
Dept: FAMILY MEDICINE | Facility: CLINIC | Age: 57
End: 2022-06-20

## 2022-06-20 DIAGNOSIS — E11.65 UNCONTROLLED TYPE 2 DIABETES MELLITUS WITH HYPERGLYCEMIA (H): ICD-10-CM

## 2022-06-20 DIAGNOSIS — E11.9 CONTROLLED TYPE 2 DIABETES MELLITUS WITHOUT COMPLICATION, UNSPECIFIED WHETHER LONG TERM INSULIN USE (H): Primary | ICD-10-CM

## 2022-06-20 DIAGNOSIS — Z12.5 SCREENING FOR PROSTATE CANCER: ICD-10-CM

## 2022-06-20 LAB
ANION GAP SERPL CALCULATED.3IONS-SCNC: 7 MMOL/L (ref 3–14)
BUN SERPL-MCNC: 21 MG/DL (ref 7–30)
CALCIUM SERPL-MCNC: 9.1 MG/DL (ref 8.5–10.1)
CHLORIDE BLD-SCNC: 108 MMOL/L (ref 94–109)
CO2 SERPL-SCNC: 27 MMOL/L (ref 20–32)
CREAT SERPL-MCNC: 1.14 MG/DL (ref 0.66–1.25)
GFR SERPL CREATININE-BSD FRML MDRD: 75 ML/MIN/1.73M2
GLUCOSE BLD-MCNC: 118 MG/DL (ref 70–99)
HBA1C MFR BLD: 6.3 % (ref 0–5.6)
POTASSIUM BLD-SCNC: 3.9 MMOL/L (ref 3.4–5.3)
PSA SERPL-MCNC: 0.95 UG/L (ref 0–4)
SODIUM SERPL-SCNC: 142 MMOL/L (ref 133–144)

## 2022-06-20 PROCEDURE — 36415 COLL VENOUS BLD VENIPUNCTURE: CPT

## 2022-06-20 PROCEDURE — 83036 HEMOGLOBIN GLYCOSYLATED A1C: CPT

## 2022-06-20 PROCEDURE — G0103 PSA SCREENING: HCPCS

## 2022-06-20 PROCEDURE — 80048 BASIC METABOLIC PNL TOTAL CA: CPT

## 2022-06-30 DIAGNOSIS — E11.65 UNCONTROLLED TYPE 2 DIABETES MELLITUS WITH HYPERGLYCEMIA (H): ICD-10-CM

## 2022-07-11 ENCOUNTER — OFFICE VISIT (OUTPATIENT)
Dept: FAMILY MEDICINE | Facility: CLINIC | Age: 57
End: 2022-07-11
Payer: COMMERCIAL

## 2022-07-11 VITALS
RESPIRATION RATE: 16 BRPM | OXYGEN SATURATION: 97 % | HEART RATE: 67 BPM | DIASTOLIC BLOOD PRESSURE: 82 MMHG | SYSTOLIC BLOOD PRESSURE: 124 MMHG | WEIGHT: 167.9 LBS | BODY MASS INDEX: 24.87 KG/M2 | TEMPERATURE: 97.6 F | HEIGHT: 69 IN

## 2022-07-11 DIAGNOSIS — J45.30 MILD PERSISTENT ASTHMA WITHOUT COMPLICATION: ICD-10-CM

## 2022-07-11 DIAGNOSIS — B37.0 THRUSH: ICD-10-CM

## 2022-07-11 DIAGNOSIS — E11.9 TYPE 2 DIABETES MELLITUS WITHOUT COMPLICATION, WITHOUT LONG-TERM CURRENT USE OF INSULIN (H): Primary | ICD-10-CM

## 2022-07-11 PROCEDURE — 99214 OFFICE O/P EST MOD 30 MIN: CPT | Performed by: NURSE PRACTITIONER

## 2022-07-11 RX ORDER — FLUCONAZOLE 100 MG/1
TABLET ORAL
Qty: 12 TABLET | Refills: 0 | Status: SHIPPED | OUTPATIENT
Start: 2022-07-11 | End: 2022-07-22

## 2022-07-11 ASSESSMENT — PAIN SCALES - GENERAL: PAINLEVEL: NO PAIN (0)

## 2022-07-11 NOTE — PROGRESS NOTES
"  Assessment & Plan     Type 2 diabetes mellitus without complication, without long-term current use of insulin (H)  Reports diabetes is well controlled but thrush started when A1C was > 9%.  Family history + for brother with Type 1 - ? ADEEL  Follow up with PCP    Thrush  Treat for persistent thrush despite treatment with Nystatin.  FOLLOW UP if no improvement.  Most likely due to diabetes + inhaler use.    - fluconazole (DIFLUCAN) 100 MG tablet  Dispense: 12 tablet; Refill: 0    Mild persistent asthma without complication  Controlled.  Reviewed ACT.          See Patient Instructions    Return in about 4 weeks (around 8/8/2022) for Recheck symptoms.    Mey Strong NP  [facility] CORAL Grimaldo is a 56 year old, presenting for the following health issues:  Mouth Problem      History of Present Illness       Reason for visit:  Persistent thrush    He eats 2-3 servings of fruits and vegetables daily.He consumes 1 sweetened beverage(s) daily.He exercises with enough effort to increase his heart rate 20 to 29 minutes per day.  He exercises with enough effort to increase his heart rate 5 days per week. He is missing 1 dose(s) of medications per week.  He is not taking prescribed medications regularly due to remembering to take.       Thrush       Duration: Since last Summer     Description (location/character/radiation): He gets a white tongue, has numbness and certain foods seem overly spicy and can burn his mouth. In the morning some times his tongue is grey.     Intensity:  mild    Accompanying signs and symptoms: He has asthma and uses his inhaler 2X daily. His tongue has \"cracks\" on it. m    History (similar episodes/previous evaluation): Yes, was treated in march     Precipitating or alleviating factors: None    Therapies tried and outcome: Nystatin- helped temporarily, salt and baking soda.       Treated with Nystatin and this helped some.  Denies GERD, dysphagia, " "nausea/vomiting     Not a smoker. No tobacco or vaping.    Recently diagnosed with Type II diabetes - A1C 9%  Also using steroid inhaler twice daily for asthma.  Using Advair inhaler twice daily.  Has been rinsing after inhaler use.    Lab Results   Component Value Date    A1C 6.3 06/20/2022    A1C 9.2 03/02/2022    A1C 6.6 10/29/2019    A1C 5.3 03/01/2012    A1C 5.0 07/25/2006        has also lost weight with diet changes.    Wt Readings from Last 4 Encounters:   07/11/22 76.2 kg (167 lb 14.4 oz)   04/18/22 79.7 kg (175 lb 9.6 oz)   03/28/22 80.4 kg (177 lb 3.2 oz)   03/02/22 79.7 kg (175 lb 9.6 oz)         Review of Systems   Constitutional, HEENT, cardiovascular, pulmonary, GI, , musculoskeletal, neuro, skin, endocrine and psych systems are negative, except as otherwise noted.      Objective    /82 (BP Location: Left arm, Patient Position: Sitting, Cuff Size: Adult Large)   Pulse 67   Temp 97.6  F (36.4  C) (Tympanic)   Resp 16   Ht 1.753 m (5' 9\")   Wt 76.2 kg (167 lb 14.4 oz)   SpO2 97%   BMI 24.79 kg/m    Body mass index is 24.79 kg/m .  Physical Exam   GENERAL: healthy, alert and no distress  HENT: normal cephalic/atraumatic, ear canals and TM's normal, nose and mouth without ulcers or lesions, oral mucous membranes moist and oropharynx with mild white patch on tongue and posterior oropharynx.  NECK: no adenopathy, no asymmetry, masses, or scars and thyroid normal to palpation  RESP: lungs clear to auscultation - no rales, rhonchi or wheezes  CV: regular rate and rhythm, normal S1 S2, no S3 or S4, no murmur, click or rub, no peripheral edema and peripheral pulses strong  ABDOMEN: soft, nontender, no hepatosplenomegaly, no masses and bowel sounds normal  MS: no gross musculoskeletal defects noted, no edema             .  ..  "

## 2022-07-19 ENCOUNTER — MYC MEDICAL ADVICE (OUTPATIENT)
Dept: FAMILY MEDICINE | Facility: CLINIC | Age: 57
End: 2022-07-19

## 2022-10-04 DIAGNOSIS — E11.65 UNCONTROLLED TYPE 2 DIABETES MELLITUS WITH HYPERGLYCEMIA (H): ICD-10-CM

## 2022-11-29 ENCOUNTER — HOSPITAL ENCOUNTER (EMERGENCY)
Facility: CLINIC | Age: 57
End: 2022-11-29
Payer: COMMERCIAL

## 2022-12-06 ENCOUNTER — LAB (OUTPATIENT)
Dept: LAB | Facility: CLINIC | Age: 57
End: 2022-12-06
Payer: COMMERCIAL

## 2022-12-06 ENCOUNTER — IMMUNIZATION (OUTPATIENT)
Dept: FAMILY MEDICINE | Facility: CLINIC | Age: 57
End: 2022-12-06
Payer: COMMERCIAL

## 2022-12-06 DIAGNOSIS — E11.9 CONTROLLED TYPE 2 DIABETES MELLITUS WITHOUT COMPLICATION, UNSPECIFIED WHETHER LONG TERM INSULIN USE (H): ICD-10-CM

## 2022-12-06 DIAGNOSIS — Z23 NEED FOR PROPHYLACTIC VACCINATION AND INOCULATION AGAINST INFLUENZA: ICD-10-CM

## 2022-12-06 DIAGNOSIS — Z23 NEED FOR VACCINATION: ICD-10-CM

## 2022-12-06 LAB — HBA1C MFR BLD: 7.2 % (ref 0–5.6)

## 2022-12-06 PROCEDURE — 90750 HZV VACC RECOMBINANT IM: CPT

## 2022-12-06 PROCEDURE — 90682 RIV4 VACC RECOMBINANT DNA IM: CPT

## 2022-12-06 PROCEDURE — 90472 IMMUNIZATION ADMIN EACH ADD: CPT

## 2022-12-06 PROCEDURE — 83036 HEMOGLOBIN GLYCOSYLATED A1C: CPT

## 2022-12-06 PROCEDURE — 36415 COLL VENOUS BLD VENIPUNCTURE: CPT

## 2022-12-06 PROCEDURE — 90471 IMMUNIZATION ADMIN: CPT

## 2022-12-06 PROCEDURE — 99207 PR NO CHARGE NURSE ONLY: CPT

## 2022-12-06 NOTE — PROGRESS NOTES
Chief Complaint   Patient presents with     Imm/Inj     Flu shot and Shingles shot, pt states insurance covered wifes shingles shot and they have the same insurance, insurance has not changed since, so would like to go ahead and get vaccine today.      Prior to immunization administration, verified patients identity using patient s name and date of birth. Please see Immunization Activity for additional information.     Screening Questionnaire for Adult Immunization    Are you sick today?   No   Do you have allergies to medications, food, a vaccine component or latex?   No   Have you ever had a serious reaction after receiving a vaccination?   No   Do you have a long-term health problem with heart, lung, kidney, or metabolic disease (e.g., diabetes), asthma, a blood disorder, no spleen, complement component deficiency, a cochlear implant, or a spinal fluid leak?  Are you on long-term aspirin therapy?   No   Do you have cancer, leukemia, HIV/AIDS, or any other immune system problem?   No   Do you have a parent, brother, or sister with an immune system problem?   No   In the past 3 months, have you taken medications that affect  your immune system, such as prednisone, other steroids, or anticancer drugs; drugs for the treatment of rheumatoid arthritis, Crohn s disease, or psoriasis; or have you had radiation treatments?   No   Have you had a seizure, or a brain or other nervous system problem?   No   During the past year, have you received a transfusion of blood or blood    products, or been given immune (gamma) globulin or antiviral drug?   No   For women: Are you pregnant or is there a chance you could become       pregnant during the next month?   No   Have you received any vaccinations in the past 4 weeks?   No     Immunization questionnaire answers were all negative.        Per orders of Dr. Carter, injection of flu and shingrix given by Yelitza Infante CMA. Patient instructed to remain in clinic for 15 minutes  afterwards, and to report any adverse reaction to me immediately.       Screening performed by Yelitza Infante CMA on 12/6/2022 at 2:13 PM.

## 2023-01-12 DIAGNOSIS — J45.30 MILD PERSISTENT ASTHMA WITHOUT COMPLICATION: ICD-10-CM

## 2023-01-12 DIAGNOSIS — E11.65 UNCONTROLLED TYPE 2 DIABETES MELLITUS WITH HYPERGLYCEMIA (H): ICD-10-CM

## 2023-01-16 ENCOUNTER — MYC MEDICAL ADVICE (OUTPATIENT)
Dept: FAMILY MEDICINE | Facility: CLINIC | Age: 58
End: 2023-01-16

## 2023-01-16 RX ORDER — FLUTICASONE PROPIONATE AND SALMETEROL XINAFOATE 230; 21 UG/1; UG/1
AEROSOL, METERED RESPIRATORY (INHALATION)
Qty: 12 G | Refills: 0 | Status: SHIPPED | OUTPATIENT
Start: 2023-01-16 | End: 2023-04-25

## 2023-01-16 ASSESSMENT — ASTHMA QUESTIONNAIRES
ACT_TOTALSCORE: 22
ACT_TOTALSCORE: 22
QUESTION_5 LAST FOUR WEEKS HOW WOULD YOU RATE YOUR ASTHMA CONTROL: WELL CONTROLLED
QUESTION_4 LAST FOUR WEEKS HOW OFTEN HAVE YOU USED YOUR RESCUE INHALER OR NEBULIZER MEDICATION (SUCH AS ALBUTEROL): NOT AT ALL
QUESTION_3 LAST FOUR WEEKS HOW OFTEN DID YOUR ASTHMA SYMPTOMS (WHEEZING, COUGHING, SHORTNESS OF BREATH, CHEST TIGHTNESS OR PAIN) WAKE YOU UP AT NIGHT OR EARLIER THAN USUAL IN THE MORNING: ONCE OR TWICE
QUESTION_2 LAST FOUR WEEKS HOW OFTEN HAVE YOU HAD SHORTNESS OF BREATH: NOT AT ALL
QUESTION_1 LAST FOUR WEEKS HOW MUCH OF THE TIME DID YOUR ASTHMA KEEP YOU FROM GETTING AS MUCH DONE AT WORK, SCHOOL OR AT HOME: A LITTLE OF THE TIME

## 2023-01-17 ENCOUNTER — MYC MEDICAL ADVICE (OUTPATIENT)
Dept: FAMILY MEDICINE | Facility: CLINIC | Age: 58
End: 2023-01-17
Payer: COMMERCIAL

## 2023-01-17 NOTE — TELEPHONE ENCOUNTER
Sent milagro refill on metformin and sent [employer] message to schedule appt      Surya Meredith RN

## 2023-02-09 ENCOUNTER — TRANSFERRED RECORDS (OUTPATIENT)
Dept: HEALTH INFORMATION MANAGEMENT | Facility: CLINIC | Age: 58
End: 2023-02-09
Payer: COMMERCIAL

## 2023-02-09 LAB — RETINOPATHY: NEGATIVE

## 2023-04-16 ENCOUNTER — HEALTH MAINTENANCE LETTER (OUTPATIENT)
Age: 58
End: 2023-04-16

## 2023-04-25 ENCOUNTER — OFFICE VISIT (OUTPATIENT)
Dept: FAMILY MEDICINE | Facility: CLINIC | Age: 58
End: 2023-04-25
Payer: COMMERCIAL

## 2023-04-25 VITALS
HEIGHT: 69 IN | SYSTOLIC BLOOD PRESSURE: 120 MMHG | RESPIRATION RATE: 20 BRPM | TEMPERATURE: 97.4 F | OXYGEN SATURATION: 95 % | DIASTOLIC BLOOD PRESSURE: 88 MMHG | WEIGHT: 171 LBS | BODY MASS INDEX: 25.33 KG/M2

## 2023-04-25 DIAGNOSIS — E11.65 UNCONTROLLED TYPE 2 DIABETES MELLITUS WITH HYPERGLYCEMIA (H): ICD-10-CM

## 2023-04-25 DIAGNOSIS — J45.30 MILD PERSISTENT ASTHMA WITHOUT COMPLICATION: ICD-10-CM

## 2023-04-25 DIAGNOSIS — Z00.00 ROUTINE GENERAL MEDICAL EXAMINATION AT A HEALTH CARE FACILITY: Primary | ICD-10-CM

## 2023-04-25 DIAGNOSIS — Z23 NEED FOR VACCINATION: ICD-10-CM

## 2023-04-25 DIAGNOSIS — R79.89 ELEVATED SERUM CREATININE: ICD-10-CM

## 2023-04-25 PROCEDURE — 99396 PREV VISIT EST AGE 40-64: CPT | Mod: 25 | Performed by: FAMILY MEDICINE

## 2023-04-25 PROCEDURE — 90750 HZV VACC RECOMBINANT IM: CPT | Performed by: FAMILY MEDICINE

## 2023-04-25 PROCEDURE — 0124A COVID-19 VACCINE BIVALENT BOOSTER 12+ (PFIZER): CPT | Performed by: FAMILY MEDICINE

## 2023-04-25 PROCEDURE — 90471 IMMUNIZATION ADMIN: CPT | Performed by: FAMILY MEDICINE

## 2023-04-25 PROCEDURE — 99207 PR FOOT EXAM NO CHARGE: CPT | Mod: 25 | Performed by: FAMILY MEDICINE

## 2023-04-25 PROCEDURE — 91312 COVID-19 VACCINE BIVALENT BOOSTER 12+ (PFIZER): CPT | Performed by: FAMILY MEDICINE

## 2023-04-25 PROCEDURE — 99214 OFFICE O/P EST MOD 30 MIN: CPT | Mod: 25 | Performed by: FAMILY MEDICINE

## 2023-04-25 RX ORDER — ALBUTEROL SULFATE 90 UG/1
2 AEROSOL, METERED RESPIRATORY (INHALATION) EVERY 6 HOURS
Qty: 18 G | Refills: 2 | Status: SHIPPED | OUTPATIENT
Start: 2023-04-25 | End: 2024-05-20

## 2023-04-25 RX ORDER — LISINOPRIL 2.5 MG/1
2.5 TABLET ORAL DAILY
Qty: 90 TABLET | Refills: 3 | Status: SHIPPED | OUTPATIENT
Start: 2023-04-25 | End: 2024-05-17

## 2023-04-25 RX ORDER — FLUTICASONE PROPIONATE AND SALMETEROL XINAFOATE 230; 21 UG/1; UG/1
AEROSOL, METERED RESPIRATORY (INHALATION)
Qty: 12 G | Refills: 11 | Status: SHIPPED | OUTPATIENT
Start: 2023-04-25 | End: 2024-05-20

## 2023-04-25 RX ORDER — LANOLIN ALCOHOL/MO/W.PET/CERES
1000 CREAM (GRAM) TOPICAL DAILY
COMMUNITY
End: 2024-05-07

## 2023-04-25 RX ORDER — ROSUVASTATIN CALCIUM 20 MG/1
20 TABLET, COATED ORAL DAILY
Qty: 90 TABLET | Refills: 3 | Status: SHIPPED | OUTPATIENT
Start: 2023-04-25 | End: 2024-05-17

## 2023-04-25 ASSESSMENT — ENCOUNTER SYMPTOMS
HEADACHES: 0
PARESTHESIAS: 0
FEVER: 0
HEMATURIA: 0
CONSTIPATION: 0
NERVOUS/ANXIOUS: 0
DYSURIA: 0
EYE PAIN: 0
MYALGIAS: 0
ABDOMINAL PAIN: 0
DIZZINESS: 0
JOINT SWELLING: 0
HEMATOCHEZIA: 0
NAUSEA: 0
FREQUENCY: 0
SHORTNESS OF BREATH: 0
SORE THROAT: 0
CHILLS: 0
WEAKNESS: 0
DIARRHEA: 0
COUGH: 0
ARTHRALGIAS: 0
PALPITATIONS: 0
HEARTBURN: 0

## 2023-04-25 ASSESSMENT — PAIN SCALES - GENERAL: PAINLEVEL: NO PAIN (0)

## 2023-04-25 ASSESSMENT — ASTHMA QUESTIONNAIRES
ACT_TOTALSCORE: 18
QUESTION_5 LAST FOUR WEEKS HOW WOULD YOU RATE YOUR ASTHMA CONTROL: SOMEWHAT CONTROLLED
QUESTION_4 LAST FOUR WEEKS HOW OFTEN HAVE YOU USED YOUR RESCUE INHALER OR NEBULIZER MEDICATION (SUCH AS ALBUTEROL): TWO OR THREE TIMES PER WEEK
ACT_TOTALSCORE: 18
QUESTION_3 LAST FOUR WEEKS HOW OFTEN DID YOUR ASTHMA SYMPTOMS (WHEEZING, COUGHING, SHORTNESS OF BREATH, CHEST TIGHTNESS OR PAIN) WAKE YOU UP AT NIGHT OR EARLIER THAN USUAL IN THE MORNING: NOT AT ALL
QUESTION_2 LAST FOUR WEEKS HOW OFTEN HAVE YOU HAD SHORTNESS OF BREATH: ONCE OR TWICE A WEEK
QUESTION_1 LAST FOUR WEEKS HOW MUCH OF THE TIME DID YOUR ASTHMA KEEP YOU FROM GETTING AS MUCH DONE AT WORK, SCHOOL OR AT HOME: SOME OF THE TIME

## 2023-04-25 NOTE — PATIENT INSTRUCTIONS
Schedule fasting lab appointment.    For your lung test, if you do not get a call within 2 days, please call [phone] to schedule.     Be consistent with low salt, low trans fat and low saturated fat diet.  Eat food rich in omega-3-fatty acids as you tolerate. (salmon, olive oil)  Eat 5 cups of vegetables, fruits and whole grains per day.  Limit starchy food (white rice, white bread, white pasta, white potatoes) to less than a cup per meal.  Minimize sweets, junk food and fastfood. Limit soda beverages to one serving per day; best to avoid it altogether though.    Exercise: moderate intensity sustained for at least 30 mins per episode, goal of 150 mins per week at least  Combine cardiovascular and resistance exercises.  These exercise recommendations are in addition to your daily activity at work or home.  Work on losing weight.      Regular foot care; don't walk barefoot  Annual eye exam.  Please request your eye doctor to furnish a copy of the eye exam report to the clinic to be placed in your record.  Flu vaccine by the end of October yearly.  Be sure to update any other recommended vaccinations for diabetics.    Preventative Care Visits include: Yearly physicals, Well-child visits, Welcome to Medicare visits, & Medicare yearly wellness exams.    The purpose of these visits is to discuss your medical history and prevent health problems before you are sick.  You may need to pay a copay, coinsurance or deductible if your visit today includes testing or treating for a new or existing condition.    Additional charges may be incurred for today's visit. If you have questions about what your insurance plan covers, please contact your Insurance Company's member service department.  If you have questions specific to a bill you have already received from [employer], please contact the [facility] Billing Office at [phone].      Preventive Health Recommendations  Male Ages 50 - 64    Yearly exam:             See your  health care provider every year in order to  o   Review health changes.   o   Discuss preventive care.    o   Review your medicines if your doctor has prescribed any.   Have a cholesterol test every 5 years, or more frequently if you are at risk for high cholesterol/heart disease.   Have a diabetes test (fasting glucose) every three years. If you are at risk for diabetes, you should have this test more often.   Have a colonoscopy at age 50, or have a yearly FIT test (stool test). These exams will check for colon cancer.    Talk with your health care provider about whether or not a prostate cancer screening test (PSA) is right for you.  You should be tested each year for STDs (sexually transmitted diseases), if you re at risk.     Shots: Get a flu shot each year. Get a tetanus shot every 10 years.     Nutrition:  Eat at least 5 servings of fruits and vegetables daily.   Eat whole-grain bread, whole-wheat pasta and brown rice instead of white grains and rice.   Get adequate Calcium and Vitamin D.     Lifestyle  Exercise for at least 150 minutes a week (30 minutes a day, 5 days a week). This will help you control your weight and prevent disease.   Limit alcohol to one drink per day.   No smoking.   Wear sunscreen to prevent skin cancer.   See your dentist every six months for an exam and cleaning.   See your eye doctor every 1 to 2 years.    Preventive Health Recommendations  Male Ages 50 - 64    Yearly exam:             See your health care provider every year in order to  o   Review health changes.   o   Discuss preventive care.    o   Review your medicines if your doctor has prescribed any.   Have a cholesterol test every 5 years, or more frequently if you are at risk for high cholesterol/heart disease.   Have a diabetes test (fasting glucose) every three years. If you are at risk for diabetes, you should have this test more often.   Have a colonoscopy at age 50, or have a yearly FIT test (stool test). These exams  will check for colon cancer.    Talk with your health care provider about whether or not a prostate cancer screening test (PSA) is right for you.  You should be tested each year for STDs (sexually transmitted diseases), if you re at risk.     Shots: Get a flu shot each year. Get a tetanus shot every 10 years.     Nutrition:  Eat at least 5 servings of fruits and vegetables daily.   Eat whole-grain bread, whole-wheat pasta and brown rice instead of white grains and rice.   Get adequate Calcium and Vitamin D.     Lifestyle  Exercise for at least 150 minutes a week (30 minutes a day, 5 days a week). This will help you control your weight and prevent disease.   Limit alcohol to one drink per day.   No smoking.   Wear sunscreen to prevent skin cancer.   See your dentist every six months for an exam and cleaning.   See your eye doctor every 1 to 2 years.

## 2023-04-25 NOTE — PROGRESS NOTES
SUBJECTIVE:   CC: Dillan is an 57 year old who presents for preventative health visit.       4/25/2023     2:53 PM   Additional Questions   Roomed by Lori WARD   Patient has been advised of split billing requirements and indicates understanding: Yes  Healthy Habits:     Getting at least 3 servings of Calcium per day:  Yes    Bi-annual eye exam:  Yes    Dental care twice a year:  NO    Sleep apnea or symptoms of sleep apnea:  None    Diet:  Diabetic    Frequency of exercise:  2-3 days/week    Duration of exercise:  15-30 minutes    Taking medications regularly:  Yes    Medication side effects:  None    PHQ-2 Total Score: 0    Additional concerns today:  Yes      Diabetes Follow-up    How often are you checking your blood sugar? A few times a month  What time of day are you checking your blood sugars (select all that apply)?  Before meals  Have you had any blood sugars above 200?  No  Have you had any blood sugars below 70?  No    What symptoms do you notice when your blood sugar is low?  Not applicable    What concerns do you have today about your diabetes? None     Do you have any of these symptoms? (Select all that apply)  No numbness or tingling in feet.  No redness, sores or blisters on feet.  No complaints of excessive thirst.  No reports of blurry vision.  No significant changes to weight.    Patient admits he had low motivation to exercise all winter.  Recently started walking for exercise.    Hyperlipidemia Follow-Up      Are you regularly taking any medication or supplement to lower your cholesterol?   Yes- Crestor misses a couple times a month-has been out of medication for 2-3 days.    Are you having muscle aches or other side effects that you think could be caused by your cholesterol lowering medication?  No    Hypertension Follow-up      Do you check your blood pressure regularly outside of the clinic? No     Are you following a low salt diet? No    Are your blood pressures ever more than 140 on the top  number (systolic) OR more   than 90 on the bottom number (diastolic), for example 140/90? na    BP Readings from Last 2 Encounters:   04/25/23 120/88   07/11/22 124/82     Hemoglobin A1C (%)   Date Value   12/06/2022 7.2 (H)   06/20/2022 6.3 (H)   10/29/2019 6.6 (H)   03/01/2012 5.3     LDL Cholesterol Calculated (mg/dL)   Date Value   03/02/2022 105 (H)   10/29/2019 107 (H)   03/12/2014 86     Asthma Follow-Up    Was ACT completed today?  Yes        4/25/2023     2:04 PM   ACT Total Scores   ACT TOTAL SCORE (Goal Greater than or Equal to 20) 18   In the past 12 months, how many times did you visit the emergency room for your asthma without being admitted to the hospital? 0   In the past 12 months, how many times were you hospitalized overnight because of your asthma? 0       How many days per week do you miss taking your asthma controller medication?  0-some days he will not take if he feels like he does not need it, in the last 2 months he has taken it twice a day.    Please describe any recent triggers for your asthma: upper respiratory infections and unsure, maybe allergies, cats.    Have you had any Emergency Room Visits, Urgent Care Visits, or Hospital Admissions since your last office visit?  No      Today's PHQ-2 Score:       4/25/2023     2:03 PM   PHQ-2 ( 1999 [employer])   Q1: Little interest or pleasure in doing things 0   Q2: Feeling down, depressed or hopeless 0   PHQ-2 Score 0   Q1: Little interest or pleasure in doing things Not at all   Q2: Feeling down, depressed or hopeless Not at all   PHQ-2 Score 0       Social History     Tobacco Use     Smoking status: Never     Smokeless tobacco: Never   Vaping Use     Vaping status: Never Used   Substance Use Topics     Alcohol use: Not Currently     Comment: rare             4/25/2023     2:02 PM   Alcohol Use   Prescreen: >3 drinks/day or >7 drinks/week? No       Last PSA:   PSA   Date Value Ref Range Status   10/29/2019 1.14 0 - 4 ug/L Final     Comment:      Assay Method:  Chemiluminescence using Siemens Vista analyzer     Prostate Specific Antigen Screen   Date Value Ref Range Status   06/20/2022 0.95 0.00 - 4.00 ug/L Final       Reviewed orders with patient. Reviewed health maintenance and updated orders accordingly - Yes  Patient Active Problem List   Diagnosis     Hyperlipidemia     Type 2 diabetes mellitus without complication, without long-term current use of insulin (H)     Mild persistent asthma without complication     Uncontrolled type 2 diabetes mellitus with hyperglycemia (H)     Past Surgical History:   Procedure Laterality Date     COLONOSCOPY N/A 10/27/2017    Procedure: COLONOSCOPY;  colonoscopy;  Surgeon: Robert Monzon MD;  Location: [facility]     LAPAROSCOPIC CHOLECYSTECTOMY  3/21/2012    Procedure:LAPAROSCOPIC CHOLECYSTECTOMY; Laparoscopic Cholecystectomy; Surgeon:ROSALIE PEDROZA; Location:[facility]     SURGICAL HISTORY OF -   2005    none       Social History     Tobacco Use     Smoking status: Never     Smokeless tobacco: Never   Vaping Use     Vaping status: Never Used   Substance Use Topics     Alcohol use: Not Currently     Comment: rare     Family History   Problem Relation Age of Onset     Hypertension Mother      Sleep Apnea Mother      Obesity Mother      Thyroid nodules Mother      Lipids Father      Genitourinary Problems Father         prostate problems - getting biopsy to rule out cancer     Prostate Cancer Father      Esophageal Cancer Father      Other Cancer Father      Cerebrovascular Disease Maternal Grandfather         at late 50's yo     Alzheimer Disease Paternal Grandmother         at 70's yo     Heart Disease Paternal Grandfather         MI at 79 yo     Genitourinary Problems Paternal Grandfather         enlarged prostate on post-mortem autopsy     Diabetes Brother         type 1     Thyroid nodules Brother      Hypertension Maternal Aunt      Hypertension Maternal Uncle          Current Outpatient Medications    Medication Sig Dispense Refill     albuterol (PROAIR HFA/PROVENTIL HFA/VENTOLIN HFA) 108 (90 Base) MCG/ACT inhaler Inhale 2 puffs into the lungs every 6 hours 18 g 2     alcohol swab prep pads Use to swab area of injection/luis carlos as directed. 100 each 3     blood glucose (NO BRAND SPECIFIED) test strip Use to test blood sugar two times daily or as directed. To accompany: Blood Glucose Monitor Brands: per insurance. 100 strip 6     blood glucose monitoring (NO BRAND SPECIFIED) meter device kit Use to test blood sugar twice times daily or as directed. Preferred blood glucose meter OR supplies to accompany: Blood Glucose Monitor Brands: per insurance. 1 kit 0     cyanocobalamin (VITAMIN B-12) 1000 MCG tablet Take 1,000 mcg by mouth daily Couple times a week       fluticasone-salmeterol (ADVAIR HFA) 230-21 MCG/ACT inhaler INHALE 1 PUFF INTO THE LUNGS 2 TIMES DAILY 12 g 11     lisinopril (ZESTRIL) 2.5 MG tablet Take 1 tablet (2.5 mg) by mouth daily 90 tablet 3     metFORMIN (GLUCOPHAGE) 500 MG tablet Take 1 tablet (500 mg) by mouth 2 times daily (with meals) 180 tablet 3     metroNIDAZOLE (METROGEL) 0.75 % external gel Apply topically 2 times daily 45 g 1     rosuvastatin (CRESTOR) 20 MG tablet Take 1 tablet (20 mg) by mouth daily 90 tablet 3     thin (NO BRAND SPECIFIED) lancets Use with lanceting device. To accompany: Blood Glucose Monitor Brands: per insurance. 200 each 6     Allergies   Allergen Reactions     Cats Shortness Of Breath     Nkda [No Known Drug Allergy]        Reviewed and updated as needed this visit by clinical staff   Tobacco  Allergies  Meds  Problems  Med Hx  Surg Hx  Fam Hx          Reviewed and updated as needed this visit by Provider                 Past Medical History:   Diagnosis Date     Calculus of gallbladder with other cholecystitis, without mention of obstruction 03/15/2012     Cholelithiasis 03/12/2012    IMO update changed this record. Please review for accuracy     Diabetes (H)  "12/2019     NO ACTIVE PROBLEMS      Uncomplicated asthma Sept 2020      Past Surgical History:   Procedure Laterality Date     COLONOSCOPY N/A 10/27/2017    Procedure: COLONOSCOPY;  colonoscopy;  Surgeon: Robert Monzon MD;  Location: [facility]     LAPAROSCOPIC CHOLECYSTECTOMY  3/21/2012    Procedure:LAPAROSCOPIC CHOLECYSTECTOMY; Laparoscopic Cholecystectomy; Surgeon:ROSALIE PEDROZA; Location:[facility]     SURGICAL HISTORY OF -   2005    none       Review of Systems   Constitutional: Negative for chills and fever.   HENT: Negative for congestion, ear pain, hearing loss and sore throat.    Eyes: Negative for pain and visual disturbance.   Respiratory: Negative for cough and shortness of breath.    Cardiovascular: Negative for chest pain, palpitations and peripheral edema.   Gastrointestinal: Negative for abdominal pain, constipation, diarrhea, heartburn, hematochezia and nausea.   Genitourinary: Negative for dysuria, frequency, genital sores, hematuria, impotence, penile discharge and urgency.   Musculoskeletal: Negative for arthralgias, joint swelling and myalgias.   Skin: Negative for rash.   Neurological: Negative for dizziness, weakness, headaches and paresthesias.   Psychiatric/Behavioral: Negative for mood changes. The patient is not nervous/anxious.          OBJECTIVE:   /88 (BP Location: Left arm, Patient Position: Chair, Cuff Size: Adult Regular)   Temp 97.4  F (36.3  C) (Tympanic)   Resp 20   Ht 1.753 m (5' 9\")   Wt 77.6 kg (171 lb)   SpO2 95%   BMI 25.25 kg/m      Physical Exam  GENERAL APPEARANCE: borderline overweight, ambulatory w/o assist, alert and no distress  EYES: pink conj, no icterus, PERRL, EOMI  HENT: ear canals and TM's normal, nose and mouth without ulcers or lesions, oropharynx clear and oral mucous membranes moist  NECK: no adenopathy, no asymmetry, masses, or scars and thyroid normal to palpation  RESP: lungs clear to auscultation - no rales, rhonchi or wheezes  CV: " regular rates and rhythm, normal S1 S2, no S3 or S4, no murmur, click or rub, no peripheral edema and peripheral pulses strong  ABDOMEN: soft, nontender, no hepatosplenomegaly, no masses and bowel sounds normal  RECTAL: deferred  MS: no musculoskeletal defects are noted and gait is age appropriate without ataxia  SKIN: no suspicious lesions or rashes  NEURO: Normal strength and tone, sensory exam grossly normal, mentation intact and speech normal    Diagnostic Test Results:  none     ASSESSMENT/PLAN:   Osmin was seen today for physical, asthma and diabetes.    Diagnoses and all orders for this visit:    Routine general medical examination at a health care facility  -     REVIEW OF HEALTH MAINTENANCE PROTOCOL ORDERS  Patient was advised on recommended screening and preventive health recommendations.  He verbalized understanding and agreed to the plans below.    Mild persistent asthma without complication  -     fluticasone-salmeterol (ADVAIR HFA) 230-21 MCG/ACT inhaler; INHALE 1 PUFF INTO THE LUNGS 2 TIMES DAILY  -     albuterol (PROAIR HFA/PROVENTIL HFA/VENTOLIN HFA) 108 (90 Base) MCG/ACT inhaler; Inhale 2 puffs into the lungs every 6 hours  -     OFFICE/OUTPT VISIT,EST,LEVL IV  PFT ordered due to persistently suboptimal ACT.  Refer to asthma clinic or pulm med as appropriate.    Uncontrolled type 2 diabetes mellitus with hyperglycemia (H)  -     Lipid panel reflex to direct LDL Fasting; Future  -     Albumin Random Urine Quantitative with Creat Ratio; Future  -     HEMOGLOBIN A1C; Future  -     lisinopril (ZESTRIL) 2.5 MG tablet; Take 1 tablet (2.5 mg) by mouth daily  -     metFORMIN (GLUCOPHAGE) 500 MG tablet; Take 1 tablet (500 mg) by mouth 2 times daily (with meals)  -     rosuvastatin (CRESTOR) 20 MG tablet; Take 1 tablet (20 mg) by mouth daily  -     OFFICE/OUTPT VISIT,EST,LEVL IV  -     Basic metabolic panel; Future  -     FOOT EXAM  -     Vitamin B12; Future  Repeat labs ordered.  Keep current  "meds.  Reinforced carbohydrate control.  Regular exercise reinforced.  Regular foot care, annual eye exam.  Flu vaccine every year.    Need for vaccination  -     ZOSTER VACCINE RECOMBINANT ADJUVANTED (SHINGRIX)  -     COVID-19,PF,PFIZER BOOSTER BIVALENT (12+YRS)        Patient has been advised of split billing requirements and indicates understanding: Yes      COUNSELING:   Reviewed preventive health counseling, as reflected in patient instructions      BMI:   Estimated body mass index is 25.25 kg/m  as calculated from the following:    Height as of this encounter: 1.753 m (5' 9\").    Weight as of this encounter: 77.6 kg (171 lb).   Weight management plan: Discussed healthy diet and exercise guidelines      He reports that he has never smoked. He has never used smokeless tobacco.        Mendoza Carter MD  [facility]  "

## 2023-04-25 NOTE — NURSING NOTE
Prior to immunization administration, verified patients identity using patient s name and date of birth. Please see Immunization Activity for additional information.     Screening Questionnaire for Adult Immunization    Are you sick today?   No   Do you have allergies to medications, food, a vaccine component or latex?   No   Have you ever had a serious reaction after receiving a vaccination?   No   Do you have a long-term health problem with heart, lung, kidney, or metabolic disease (e.g., diabetes), asthma, a blood disorder, no spleen, complement component deficiency, a cochlear implant, or a spinal fluid leak?  Are you on long-term aspirin therapy?   Yes   Do you have cancer, leukemia, HIV/AIDS, or any other immune system problem?   No   Do you have a parent, brother, or sister with an immune system problem?   Yes   In the past 3 months, have you taken medications that affect  your immune system, such as prednisone, other steroids, or anticancer drugs; drugs for the treatment of rheumatoid arthritis, Crohn s disease, or psoriasis; or have you had radiation treatments?   No   Have you had a seizure, or a brain or other nervous system problem?   No   During the past year, have you received a transfusion of blood or blood    products, or been given immune (gamma) globulin or antiviral drug?   No   For women: Are you pregnant or is there a chance you could become       pregnant during the next month?   No   Have you received any vaccinations in the past 4 weeks?   No     Immunization questionnaire was positive for at least one answer.  Notified Emma Buckley.      Injection of Shigles and Covid booster given by Lori Figueroa CMA. Patient instructed to remain in clinic for 15 minutes afterwards, and to report any adverse reactions.     Screening performed by Lori Figueroa CMA on 4/25/2023 at 3:06 PM.

## 2023-05-15 ENCOUNTER — LAB (OUTPATIENT)
Dept: LAB | Facility: CLINIC | Age: 58
End: 2023-05-15
Payer: COMMERCIAL

## 2023-05-15 DIAGNOSIS — E11.65 UNCONTROLLED TYPE 2 DIABETES MELLITUS WITH HYPERGLYCEMIA (H): ICD-10-CM

## 2023-05-15 LAB
ANION GAP SERPL CALCULATED.3IONS-SCNC: 12 MMOL/L (ref 7–15)
BUN SERPL-MCNC: 14.6 MG/DL (ref 8–23)
CALCIUM SERPL-MCNC: 9.3 MG/DL (ref 8.6–10)
CHLORIDE SERPL-SCNC: 104 MMOL/L (ref 98–107)
CHOLEST SERPL-MCNC: 130 MG/DL
CREAT SERPL-MCNC: 1.27 MG/DL (ref 0.67–1.17)
CREAT UR-MCNC: 409 MG/DL
DEPRECATED HCO3 PLAS-SCNC: 24 MMOL/L (ref 22–29)
GFR SERPL CREATININE-BSD FRML MDRD: 65 ML/MIN/1.73M2
GLUCOSE SERPL-MCNC: 140 MG/DL (ref 70–99)
HBA1C MFR BLD: 7.8 % (ref 0–5.6)
HDLC SERPL-MCNC: 36 MG/DL
LDLC SERPL CALC-MCNC: 63 MG/DL
MICROALBUMIN UR-MCNC: <12 MG/L
MICROALBUMIN/CREAT UR: NORMAL MG/G{CREAT}
NONHDLC SERPL-MCNC: 94 MG/DL
POTASSIUM SERPL-SCNC: 4.1 MMOL/L (ref 3.4–5.3)
SODIUM SERPL-SCNC: 140 MMOL/L (ref 136–145)
TRIGL SERPL-MCNC: 153 MG/DL
VIT B12 SERPL-MCNC: 409 PG/ML (ref 232–1245)

## 2023-05-15 PROCEDURE — 82607 VITAMIN B-12: CPT

## 2023-05-15 PROCEDURE — 80048 BASIC METABOLIC PNL TOTAL CA: CPT

## 2023-05-15 PROCEDURE — 80061 LIPID PANEL: CPT

## 2023-05-15 PROCEDURE — 36415 COLL VENOUS BLD VENIPUNCTURE: CPT

## 2023-05-15 PROCEDURE — 83036 HEMOGLOBIN GLYCOSYLATED A1C: CPT

## 2023-05-15 PROCEDURE — 82570 ASSAY OF URINE CREATININE: CPT

## 2023-05-15 PROCEDURE — 82043 UR ALBUMIN QUANTITATIVE: CPT

## 2023-05-17 ENCOUNTER — HOSPITAL ENCOUNTER (OUTPATIENT)
Dept: RESPIRATORY THERAPY | Facility: CLINIC | Age: 58
Discharge: HOME | End: 2023-05-17
Attending: FAMILY MEDICINE | Admitting: FAMILY MEDICINE
Payer: COMMERCIAL

## 2023-05-17 DIAGNOSIS — J45.30 MILD PERSISTENT ASTHMA WITHOUT COMPLICATION: ICD-10-CM

## 2023-05-17 PROCEDURE — 94060 EVALUATION OF WHEEZING: CPT

## 2023-05-17 PROCEDURE — 94726 PLETHYSMOGRAPHY LUNG VOLUMES: CPT

## 2023-05-17 PROCEDURE — 94729 DIFFUSING CAPACITY: CPT

## 2023-05-17 RX ORDER — INHALER, ASSIST DEVICES
1 SPACER (EA) MISCELLANEOUS ONCE
Status: DISCONTINUED | OUTPATIENT
Start: 2023-05-17 | End: 2023-05-18 | Stop reason: HOSPADM

## 2023-05-17 NOTE — PROGRESS NOTES
Thank you for completing pulmonary function testing today.  All results will be scanned into your epic results for your doctor to review.  Please resume taking all your current prescribed medications and diet as directed by your provider.   If you have not heard from your provider about your testing within two weeks and do not have a follow-up appointment scheduled with them please contact your provider about any questions you have concerning your testing.   Thank you  [facility]

## 2023-05-18 LAB
DLCOUNC-%PRED-PRE: 86 %
DLCOUNC-PRE: 23.38 ML/MIN/MMHG
DLCOUNC-PRED: 26.97 ML/MIN/MMHG
ERV-%PRED-PRE: 31 %
ERV-PRE: 0.4 L
ERV-PRED: 1.28 L
EXPTIME-PRE: 12.44 SEC
FEF2575-%PRED-POST: 21 %
FEF2575-%PRED-PRE: 15 %
FEF2575-POST: 0.65 L/SEC
FEF2575-PRE: 0.46 L/SEC
FEF2575-PRED: 2.98 L/SEC
FEFMAX-%PRED-PRE: 35 %
FEFMAX-PRE: 3.2 L/SEC
FEFMAX-PRED: 9.07 L/SEC
FEV1-%PRED-PRE: 37 %
FEV1-PRE: 1.33 L
FEV1FEV6-PRE: 55 %
FEV1FEV6-PRED: 79 %
FEV1FVC-PRE: 48 %
FEV1FVC-PRED: 78 %
FEV1SVC-PRE: 39 %
FEV1SVC-PRED: 73 %
FIFMAX-PRE: 2.93 L/SEC
FRCPLETH-%PRED-PRE: 156 %
FRCPLETH-PRE: 5.55 L
FRCPLETH-PRED: 3.54 L
FVC-%PRED-PRE: 60 %
FVC-PRE: 2.74 L
FVC-PRED: 4.52 L
IC-%PRED-PRE: 83 %
IC-PRE: 2.99 L
IC-PRED: 3.56 L
RVPLETH-%PRED-PRE: 218 %
RVPLETH-PRE: 5.15 L
RVPLETH-PRED: 2.36 L
TLCPLETH-%PRED-PRE: 123 %
TLCPLETH-PRE: 8.54 L
TLCPLETH-PRED: 6.92 L
VA-%PRED-PRE: 79 %
VA-PRE: 5.03 L
VC-%PRED-PRE: 69 %
VC-PRE: 3.38 L
VC-PRED: 4.84 L

## 2023-09-17 ENCOUNTER — HEALTH MAINTENANCE LETTER (OUTPATIENT)
Age: 58
End: 2023-09-17

## 2023-12-07 ENCOUNTER — OFFICE VISIT (OUTPATIENT)
Dept: INTERNAL MEDICINE | Facility: CLINIC | Age: 58
End: 2023-12-07
Payer: COMMERCIAL

## 2023-12-07 VITALS
WEIGHT: 173.8 LBS | HEIGHT: 69 IN | RESPIRATION RATE: 18 BRPM | HEART RATE: 72 BPM | OXYGEN SATURATION: 96 % | DIASTOLIC BLOOD PRESSURE: 78 MMHG | SYSTOLIC BLOOD PRESSURE: 118 MMHG | TEMPERATURE: 97.3 F | BODY MASS INDEX: 25.74 KG/M2

## 2023-12-07 DIAGNOSIS — J01.00 ACUTE NON-RECURRENT MAXILLARY SINUSITIS: Primary | ICD-10-CM

## 2023-12-07 DIAGNOSIS — E11.65 UNCONTROLLED TYPE 2 DIABETES MELLITUS WITH HYPERGLYCEMIA (H): ICD-10-CM

## 2023-12-07 DIAGNOSIS — B37.0 THRUSH: ICD-10-CM

## 2023-12-07 DIAGNOSIS — J45.30 MILD PERSISTENT ASTHMA WITHOUT COMPLICATION: ICD-10-CM

## 2023-12-07 PROCEDURE — 99214 OFFICE O/P EST MOD 30 MIN: CPT | Performed by: INTERNAL MEDICINE

## 2023-12-07 RX ORDER — AMOXICILLIN AND CLAVULANATE POTASSIUM 500; 125 MG/1; MG/1
1 TABLET, FILM COATED ORAL 2 TIMES DAILY
Qty: 14 TABLET | Refills: 0 | Status: SHIPPED | OUTPATIENT
Start: 2023-12-07 | End: 2024-05-07

## 2023-12-07 RX ORDER — FLUCONAZOLE 150 MG/1
150 TABLET ORAL
Qty: 6 TABLET | Refills: 0 | Status: SHIPPED | OUTPATIENT
Start: 2023-12-07 | End: 2023-12-23

## 2023-12-07 ASSESSMENT — ASTHMA QUESTIONNAIRES: ACT_TOTALSCORE: 9

## 2023-12-07 ASSESSMENT — PAIN SCALES - GENERAL: PAINLEVEL: MILD PAIN (3)

## 2023-12-07 NOTE — PROGRESS NOTES
Pieter Grimaldo is a 58 year old, presenting for the following health issues:  Sinus Problem (3 weeks)      12/7/2023     1:30 PM   Additional Questions   Roomed by Marlee MUÑOZ       History of Present Illness       Reason for visit:  Possible sinus infection and ongoing thrush in mouth  Symptom onset:  3-4 weeks ago  Symptoms include:  Face and head ache, ongoing stuffiness and plugged ears with loss or diminished sense of taste and smell  Symptom intensity:  Moderate  Symptom progression:  Staying the same  Had these symptoms before:  No  What makes it worse:  No  What makes it better:  No    He eats 2-3 servings of fruits and vegetables daily.He consumes 1 sweetened beverage(s) daily.He exercises with enough effort to increase his heart rate 20 to 29 minutes per day.  He exercises with enough effort to increase his heart rate 4 days per week. He is missing 1 dose(s) of medications per week.  He is not taking prescribed medications regularly due to remembering to take.          EMR reviewed including:             Complaint, History of Chief Complaint, Corresponding Review of Systems, and Complaint Specific Physical Examination.    #1   Facial and frontal sinus discomfort.    Off-and-on for the last 3 weeks  Getting worse  Pressure in the ears  Mild disequilibrium  Denies fevers or chills.  Symptoms worse when he lays down or bends over.  No sore throat or difficulty swallowing.  Taking food and fluids without difficulty.        Exam:   ENT: Pharynx is non-erythemous, moderate yellow PND, there is significant nasal congestion, TM's are red and retracted, hearing intact bilaterally. No carotid bruits are heard. No JVD seen. Thyroid is not nodular or enlarged.   LUNGS: Very scant expiratory wheezing is noted bilaterally.  Airflow is symmetric, no intercostal retraction or stridor is noted. No coughing is noted during visit.   HEART:  regular without rubs, clicks, gallops, or murmurs. PMI is nondisplaced.  "Upstrokes are brisk. S1,S2 are heard.        Patient has been interviewed, applicable history and applied review of systems have been performed.    Vital Signs:   /78 (BP Location: Right arm, Patient Position: Chair)   Pulse 72   Temp 97.3  F (36.3  C) (Temporal)   Resp 18   Ht 1.759 m (5' 9.25\")   Wt 78.8 kg (173 lb 12.8 oz)   SpO2 96%   BMI 25.48 kg/m        Decision Making    Problem and Complexity     1. Acute non-recurrent maxillary sinusitis  Recommend avoiding decongestants.  Humidity, antibiotic, antihistamines as needed  - amoxicillin-clavulanate (AUGMENTIN) 500-125 MG tablet; Take 1 tablet by mouth 2 times daily  Dispense: 14 tablet; Refill: 0    2. Thrush  Recurs frequently with any antibiotic use.  Patient is on steroid inhaler.  - fluconazole (DIFLUCAN) 150 MG tablet; Take 1 tablet (150 mg) by mouth every 3 days for 6 doses  Dispense: 6 tablet; Refill: 0    3. Mild persistent asthma without complication  Continue albuterol as steroid-based inhaler    4. Uncontrolled type 2 diabetes mellitus with hyperglycemia (H)  Patient wishes to follow-up with his primary care provider.  Will have lab work including glycosylated hemoglobin performed at that time.                                FOLLOW UP   I have asked the patient to make an appointment for followup with primary care provider in the upcoming couple weeks    Regarding routine vaccinations:  I have reviewed the patient's vaccination schedule and discussed the benefits of prophylactic vaccination in detail.  I recommend the patient contact their pharmacist for vaccinations.  Discussed that most insurance companies now favor reimbursement to the pharmacies and it will financially behoove the patient to have vaccinations performed at their pharmacy.        I have carefully explained the diagnosis and treatment options to the patient.  The patient has displayed an understanding of the above, and all subsequent questions were " answered.      DO APOLINAR Shelton    Portions of this note were produced using [email or web address]  Although every attempt at real-time proof reading has been made, occasional grammar/syntax errors may have been missed.

## 2024-02-04 ENCOUNTER — HEALTH MAINTENANCE LETTER (OUTPATIENT)
Age: 59
End: 2024-02-04

## 2024-03-26 ENCOUNTER — PATIENT OUTREACH (OUTPATIENT)
Dept: CARE COORDINATION | Facility: CLINIC | Age: 59
End: 2024-03-26

## 2024-05-06 SDOH — HEALTH STABILITY: PHYSICAL HEALTH: ON AVERAGE, HOW MANY MINUTES DO YOU ENGAGE IN EXERCISE AT THIS LEVEL?: 30 MIN

## 2024-05-06 SDOH — HEALTH STABILITY: PHYSICAL HEALTH: ON AVERAGE, HOW MANY DAYS PER WEEK DO YOU ENGAGE IN MODERATE TO STRENUOUS EXERCISE (LIKE A BRISK WALK)?: 4 DAYS

## 2024-05-06 ASSESSMENT — ASTHMA QUESTIONNAIRES
ACT_TOTALSCORE: 17
QUESTION_3 LAST FOUR WEEKS HOW OFTEN DID YOUR ASTHMA SYMPTOMS (WHEEZING, COUGHING, SHORTNESS OF BREATH, CHEST TIGHTNESS OR PAIN) WAKE YOU UP AT NIGHT OR EARLIER THAN USUAL IN THE MORNING: ONCE OR TWICE
QUESTION_1 LAST FOUR WEEKS HOW MUCH OF THE TIME DID YOUR ASTHMA KEEP YOU FROM GETTING AS MUCH DONE AT WORK, SCHOOL OR AT HOME: SOME OF THE TIME
QUESTION_2 LAST FOUR WEEKS HOW OFTEN HAVE YOU HAD SHORTNESS OF BREATH: ONCE OR TWICE A WEEK
ACT_TOTALSCORE: 17
QUESTION_5 LAST FOUR WEEKS HOW WOULD YOU RATE YOUR ASTHMA CONTROL: SOMEWHAT CONTROLLED
QUESTION_4 LAST FOUR WEEKS HOW OFTEN HAVE YOU USED YOUR RESCUE INHALER OR NEBULIZER MEDICATION (SUCH AS ALBUTEROL): TWO OR THREE TIMES PER WEEK

## 2024-05-06 ASSESSMENT — SOCIAL DETERMINANTS OF HEALTH (SDOH): HOW OFTEN DO YOU GET TOGETHER WITH FRIENDS OR RELATIVES?: ONCE A WEEK

## 2024-05-07 ENCOUNTER — OFFICE VISIT (OUTPATIENT)
Dept: FAMILY MEDICINE | Facility: CLINIC | Age: 59
End: 2024-05-07
Payer: COMMERCIAL

## 2024-05-07 VITALS
BODY MASS INDEX: 24.2 KG/M2 | RESPIRATION RATE: 20 BRPM | OXYGEN SATURATION: 93 % | DIASTOLIC BLOOD PRESSURE: 86 MMHG | TEMPERATURE: 97.8 F | WEIGHT: 163.4 LBS | HEART RATE: 70 BPM | SYSTOLIC BLOOD PRESSURE: 132 MMHG | HEIGHT: 69 IN

## 2024-05-07 DIAGNOSIS — Z12.83 SKIN EXAM, SCREENING FOR CANCER: ICD-10-CM

## 2024-05-07 DIAGNOSIS — E11.65 UNCONTROLLED TYPE 2 DIABETES MELLITUS WITH HYPERGLYCEMIA (H): ICD-10-CM

## 2024-05-07 DIAGNOSIS — M25.572 LEFT ANKLE PAIN, UNSPECIFIED CHRONICITY: ICD-10-CM

## 2024-05-07 DIAGNOSIS — R03.0 PREHYPERTENSION: ICD-10-CM

## 2024-05-07 DIAGNOSIS — Z00.00 ROUTINE GENERAL MEDICAL EXAMINATION AT A HEALTH CARE FACILITY: Primary | ICD-10-CM

## 2024-05-07 DIAGNOSIS — L71.9 ROSACEA: ICD-10-CM

## 2024-05-07 DIAGNOSIS — J45.30 MILD PERSISTENT ASTHMA WITHOUT COMPLICATION: ICD-10-CM

## 2024-05-07 DIAGNOSIS — J30.89 PERENNIAL ALLERGIC RHINITIS: ICD-10-CM

## 2024-05-07 PROCEDURE — 90471 IMMUNIZATION ADMIN: CPT | Performed by: FAMILY MEDICINE

## 2024-05-07 PROCEDURE — 99214 OFFICE O/P EST MOD 30 MIN: CPT | Mod: 25 | Performed by: FAMILY MEDICINE

## 2024-05-07 PROCEDURE — 90715 TDAP VACCINE 7 YRS/> IM: CPT | Performed by: FAMILY MEDICINE

## 2024-05-07 PROCEDURE — 99396 PREV VISIT EST AGE 40-64: CPT | Mod: 25 | Performed by: FAMILY MEDICINE

## 2024-05-07 RX ORDER — METRONIDAZOLE 7.5 MG/G
GEL TOPICAL 2 TIMES DAILY
Qty: 45 G | Refills: 3 | Status: SHIPPED | OUTPATIENT
Start: 2024-05-07

## 2024-05-07 ASSESSMENT — PAIN SCALES - GENERAL: PAINLEVEL: NO PAIN (0)

## 2024-05-07 NOTE — PROGRESS NOTES
Preventive Care Visit  [facility]  Mendoza Carter MD, Family Medicine  May 7, 2024      Assessment & Plan     Routine general medical examination at a health care facility  Patient was advised on recommended screening and preventive health recommendations.  He verbalized understanding and agreed to the plans below.     Uncontrolled type 2 diabetes mellitus with hyperglycemia (H)  Repeat labs ordered. Patient preferred to have them done when fasting.  Reinforced carbohydrate control.  Regular exercise 30 mins per day, 3 times a week.  Regular foot care, annual eye exam.  Flu vaccine every year.   Refill meds when labs are completed to =know if need to adjust doses.  - HEMOGLOBIN A1C  - BASIC METABOLIC PANEL  - Lipid panel reflex to direct LDL Fasting  - Albumin Random Urine Quantitative with Creat Ratio  - blood glucose (NO BRAND SPECIFIED) test strip  Dispense: 100 strip; Refill: 6  - FOOT EXAM  - OFFICE/OUTPT VISIT,EST,LEVL IV    Rosacea  Stable on metrogel.  - metroNIDAZOLE (METROGEL) 0.75 % external gel  Dispense: 45 g; Refill: 3  - OFFICE/OUTPT VISIT,EST,LEVL IV    Mild persistent asthma without complication  Uncontrolled due to recent viral URI but patient not in exacerbation today.  Continue current regimen.  Repeat ACT in 2 months. Also consult with allergist.  - Adult Allergy/Asthma  Referral  - OFFICE/OUTPT VISIT,EST,LEVL IV    Perennial allergic rhinitis  Uncontrolled.  Patient verifies no improvement with previous use of lfonase.  Consult allergist for further management as uncontrolled AR can exacerbate asthma as well.  - Adult Allergy/Asthma  Referral  - OFFICE/OUTPT VISIT,EST,LEVL IV    Left ankle pain, unspecified chronicity  Possible gout attack that resolved spontaneously in jan 2024. No flare today.  Check uric acid.  May consider allopurinol if significantly elevated urate or if with frequent recurretn flare.  - Uric acid  - OFFICE/OUTPT  VISIT,EST,LEVL IV    Prehypertension  Patient was advised BP on recheck is in prehypertensive range.  No previous diagnosis of HTN but patient is on low dose ACE-I due to DM.  Due to DM ideal BP is less than 130/80.  Patient was advised low salt diet.  Recheck BP 8 weeks with RN. If still >130/80, discuss treatment.   - OFFICE/OUTPT VISIT,EST,LEVL IV    Skin exam, screening for cancer  - Adult Dermatology  Referral      Patient has been advised of split billing requirements and indicates understanding: Yes      Counseling  Appropriate preventive services were discussed with this patient, including applicable screening as appropriate for fall prevention, nutrition, physical activity, Tobacco-use cessation, weight loss and cognition.  Checklist reviewing preventive services available has been given to the patient.  Reviewed patient's diet, addressing concerns and/or questions.   The patient was instructed to see the dentist every 6 months.       Patient Instructions   Schedule your lab tests; make sure you are fasting for more than 8 hrs.  Further recommendations when results are out.  Referrals to dermatology and allergy/asthma clinic has been signed. Schedulers will call you in the next 3-5 business days.   Be consistent with low salt, low trans fat and low saturated fat diet.  Eat food rich in omega-3-fatty acids as you tolerate. (salmon, olive oil)  Eat 5 cups of vegetables, fruits and whole grains per day.  Limit starchy food (white rice, white bread, white pasta, white potatoes) to less than a cup per meal.  Minimize sweets, junk food and fastfood. Limit soda beverages to one serving per day; best to avoid it altogether though.    Exercise: moderate intensity sustained for at least 30 mins per episode, goal of 150 mins per week at least  Combine cardiovascular and resistance exercises.  These exercise recommendations are in addition to your daily activity at work or home.  Work on losing  "weight.      Regular foot care; don't walk barefoot  Annual eye exam.  Please request your eye doctor to furnish a copy of the eye exam report to the clinic to be placed in your record.  Flu vaccine by the end of October yearly.  Be sure to update any other recommended vaccinations for diabetics: pneumococcal and covid-19 vaccines  Get RSV vaccine with flu shot in the fall.    Preventative Care Visits include: Yearly physicals, Well-child visits, Welcome to Medicare visits, & Medicare yearly wellness exams.    The purpose of these visits is to discuss your medical history and prevent health problems before you are sick.  You may need to pay a copay, coinsurance or deductible if your visit today includes testing or treating for a new or existing condition.    Additional charges may be incurred for today's visit. If you have questions about what your insurance plan covers, please contact your Insurance Company's member service department.  If you have questions specific to a bill you have already received from [employer], please contact the [employer] Billing Office at [phone].  .       Preventive Care Advice   This is general advice we often give to help people stay healthy. Your care team may have specific advice just for you. Please talk to your care team about your own preventive care needs.  Lifestyle  Exercise at least 150 minutes each week (30 minutes a day, 5 days a week).  Do muscle strengthening activities 2 days a week. These help control your weight and prevent disease.  No smoking.  Wear sunscreen to prevent skin cancer.  Have your home tested for radon every 2 to 5 years. Radon is a colorless, odorless gas that can harm your lungs. To learn more, go to www.health.[facility] and search for \"Radon in Homes.\"  Keep guns unloaded and locked up in a safe place like a safe or gun vault, or, use a gun lock and hide the keys. Always lock away bullets separately. To learn more, visit dps.[place].gov and search for " "\"safe gun storage.\"  Nutrition  Eat 5 or more servings of fruits and vegetables each day.  Try wheat bread, brown rice and whole grain pasta (instead of white bread, rice, and pasta).  Get enough calcium and vitamin D. Check the label on foods and aim for 100% of the RDA (recommended daily allowance).  Regular exams  Have a dental exam and cleaning every 6 months.  See your health care team every year to talk about:  Any changes in your health.  Any medicines your care team has prescribed.  Preventive care, family planning, and ways to prevent chronic diseases.  Shots (vaccines)   HPV shots (up to age 26), if you've never had them before.  Hepatitis B shots (up to age 59), if you've never had them before.  COVID-19 shot: Get this shot when it's due.  Flu shot: Get a flu shot every year.  Tetanus shot: Get a tetanus shot every 10 years.  Pneumococcal, hepatitis A, and RSV shots: Ask your care team if you need these based on your risk.  Shingles shot (for age 50 and up).  General health tests  Diabetes screening:  Starting at age 35, Get screened for diabetes at least every 3 years.  If you are younger than age 35, ask your care team if you should be screened for diabetes.  Cholesterol test: At age 39, start having a cholesterol test every 5 years, or more often if advised.  Bone density scan (DEXA): At age 50, ask your care team if you should have this scan for osteoporosis (brittle bones).  Hepatitis C: Get tested at least once in your life.  Abdominal aortic aneurysm screening: Talk to your doctor about having this screening if you:  Have ever smoked; and  Are biologically male; and  Are between the ages of 65 and 75.  STIs (sexually transmitted infections)  Before age 24: Ask your care team if you should be screened for STIs.  After age 24: Get screened for STIs if you're at risk. You are at risk for STIs (including HIV) if:  You are sexually active with more than one person.  You don't use condoms every " time.  You or a partner was diagnosed with a sexually transmitted infection.  If you are at risk for HIV, ask about PrEP medicine to prevent HIV.  Get tested for HIV at least once in your life, whether you are at risk for HIV or not.  Cancer screening tests  Cervical cancer screening: If you have a cervix, begin getting regular cervical cancer screening tests at age 21. Most people who have regular screenings with normal results can stop after age 65. Talk about this with your provider.  Breast cancer scan (mammogram): If you've ever had breasts, begin having regular mammograms starting at age 40. This is a scan to check for breast cancer.  Colon cancer screening: It is important to start screening for colon cancer at age 45.  Have a colonoscopy test every 10 years (or more often if you're at risk) Or, ask your provider about stool tests like a FIT test every year or Cologuard test every 3 years.  To learn more about your testing options, visit: www.[employer]/655676.pdf.  For help making a decision, visit: anthony/fe35587.  Prostate cancer screening test: If you have a prostate and are age 55 to 69, ask your provider if you would benefit from a yearly prostate cancer screening test.  Lung cancer screening: If you are a current or former smoker age 50 to 80, ask your care team if ongoing lung cancer screenings are right for you.  For informational purposes only. Not to replace the advice of your health care provider. Copyright   2023 [facility]. All rights reserved. Clinically reviewed by the [facility] Transitions Program. [place] [id number] - REV 04/24.      Pieter Grimaldo is a 58 year old, presenting for the following:  Physical, Diabetes, Asthma, Hypertension, and Lipids        5/7/2024     2:22 PM   Additional Questions   Roomed by Ana CRUMP MA   Accompanied by self         5/7/2024     2:22 PM   Patient Reported Additional Medications   Patient reports taking the following new  medications none        Health Care Directive  Patient does not have a Health Care Directive or Living Will: Discussed advance care planning with patient; however, patient declined at this time.    HPI    Diabetes Follow-up    How often are you checking your blood sugar? A few times a month  What time of day are you checking your blood sugars (select all that apply)?   Morning fasting  Have you had any blood sugars above 200?  No  Have you had any blood sugars below 70?  No  What symptoms do you notice when your blood sugar is low?  None  What concerns do you have today about your diabetes? None   Do you have any of these symptoms? (Select all that apply)  Weight loss  Have you had a diabetic eye exam in the last 12 months? No    Metformin may have decreased his appetite overall.        Hyperlipidemia Follow-Up    Are you regularly taking any medication or supplement to lower your cholesterol?   Yes- rosuvastatin 20mg   Are you having muscle aches or other side effects that you think could be caused by your cholesterol lowering medication?  No    Hypertension Follow-up    Do you check your blood pressure regularly outside of the clinic? No   Are you following a low salt diet? No  Are your blood pressures ever more than 140 on the top number (systolic) OR more   than 90 on the bottom number (diastolic), for example 140/90? No unknown    History of left ankle pain in Jan 2024 after he ate lots of chicken wings and shrimp.  Lasted for 3-4 days with spontaneous resolution.  Burning throbbing pain; no swelling or erythema  Took Ibuprofen and APAP.  Fam hx of gout (father, other members)    BP Readings from Last 2 Encounters:   05/07/24 132/86   12/07/23 118/78     Hemoglobin A1C (%)   Date Value   05/15/2023 7.8 (H)   12/06/2022 7.2 (H)   10/29/2019 6.6 (H)   03/01/2012 5.3     LDL Cholesterol Calculated (mg/dL)   Date Value   05/15/2023 63   03/02/2022 105 (H)   10/29/2019 107 (H)   03/12/2014 86       Asthma  Follow-Up    Was ACT completed today?  Yes        5/6/2024     5:28 PM   ACT Total Scores   ACT TOTAL SCORE (Goal Greater than or Equal to 20) 17   In the past 12 months, how many times did you visit the emergency room for your asthma without being admitted to the hospital? 0   In the past 12 months, how many times were you hospitalized overnight because of your asthma? 0       How many days per week do you miss taking your asthma controller medication?  0 doesn't always do night time dose.  Please describe any recent triggers for your asthma: exercise or sports; viral URI  Have you had any Emergency Room Visits, Urgent Care Visits, or Hospital Admissions since your last office visit?  No  Patient said he had an upper respiratory viral infection few weeks ago that he needed to use albuterol few times a week due to wheezing.  Exercise also triggers asthma.    Recent Labs   Lab Test 05/15/23  1236 12/06/22  1349 06/20/22  1330 03/02/22  0808 03/02/22  0808 10/29/19  1339 10/11/19  1843   A1C 7.8* 7.2* 6.3*   < > 9.2* 6.6*  --    LDL 63  --   --   --  105* 107*  --    HDL 36*  --   --   --  35* 44  --    TRIG 153*  --   --   --  161* 155*  --    ALT  --   --   --   --   --   --  69   CR 1.27*  --  1.14   < > 1.01  --  1.18   GFRESTIMATED 65  --  75   < > 86  --  69   GFRESTBLACK  --   --   --   --   --   --  79   POTASSIUM 4.1  --  3.9   < > 4.1  --  4.0    < > = values in this interval not displayed.           5/6/2024   General Health   How would you rate your overall physical health? Good   Feel stress (tense, anxious, or unable to sleep) Not at all         5/6/2024   Nutrition   Three or more servings of calcium each day? (!) NO   Diet: Diabetic   How many servings of fruit and vegetables per day? (!) 2-3   How many sweetened beverages each day? (!) 2         5/6/2024   Exercise   Days per week of moderate/strenous exercise 4 days   Average minutes spent exercising at this level 30 min         5/6/2024   Social  Factors   Frequency of gathering with friends or relatives Once a week   Worry food won't last until get money to buy more No   Food not last or not have enough money for food? No   Do you have housing?  Yes   Are you worried about losing your housing? No   Lack of transportation? No   Unable to get utilities (heat,electricity)? No         5/6/2024   Fall Risk   Fallen 2 or more times in the past year? No   Trouble with walking or balance? No          5/6/2024   Dental   Dentist two times every year? (!) NO         5/6/2024   TB Screening   Were you born outside of the [place]? No       Today's PHQ-2 Score:       5/6/2024     5:26 PM   PHQ-2 ( 1999 [employer])   Q1: Little interest or pleasure in doing things 0   Q2: Feeling down, depressed or hopeless 0   PHQ-2 Score 0   Q1: Little interest or pleasure in doing things Not at all   Q2: Feeling down, depressed or hopeless Not at all   PHQ-2 Score 0         5/6/2024   Substance Use   Alcohol more than 3/day or more than 7/wk No   Do you use any other substances recreationally? No     Social History     Tobacco Use    Smoking status: Never    Smokeless tobacco: Never   Vaping Use    Vaping status: Never Used   Substance Use Topics    Alcohol use: Not Currently     Comment: rare    Drug use: No           5/6/2024   STI Screening   New sexual partner(s) since last STI/HIV test? No   Last PSA:   PSA   Date Value Ref Range Status   10/29/2019 1.14 0 - 4 ug/L Final     Comment:     Assay Method:  Chemiluminescence using Siemens Vista analyzer     Prostate Specific Antigen Screen   Date Value Ref Range Status   06/20/2022 0.95 0.00 - 4.00 ug/L Final     ASCVD Risk   The 10-year ASCVD risk score (Sidra NGUYEN, et al., 2019) is: 14.5%    Values used to calculate the score:      Age: 58 years      Sex: Male      Is Non- : No      Diabetic: Yes      Tobacco smoker: No      Systolic Blood Pressure: 132 mmHg      Is BP treated: No      HDL Cholesterol: 36  mg/dL      Total Cholesterol: 130 mg/dL         Reviewed and updated as needed this visit by Provider     Meds                Past Medical History:   Diagnosis Date    Calculus of gallbladder with other cholecystitis, without mention of obstruction 03/15/2012    Cholelithiasis 03/12/2012    IMO update changed this record. Please review for accuracy    Diabetes (H) 12/2019    NO ACTIVE PROBLEMS     Uncomplicated asthma Sept 2020     Past Surgical History:   Procedure Laterality Date    COLONOSCOPY N/A 10/27/2017    Procedure: COLONOSCOPY;  colonoscopy;  Surgeon: Robert Monzon MD;  Location: [facility]    LAPAROSCOPIC CHOLECYSTECTOMY  3/21/2012    Procedure:LAPAROSCOPIC CHOLECYSTECTOMY; Laparoscopic Cholecystectomy; Surgeon:ROSALIE PEDROZA; Location:[facility]    SURGICAL HISTORY OF -   2005    none     Patient Active Problem List   Diagnosis    Hyperlipidemia    Type 2 diabetes mellitus without complication, without long-term current use of insulin (H)    Mild persistent asthma without complication    Uncontrolled type 2 diabetes mellitus with hyperglycemia (H)     Past Surgical History:   Procedure Laterality Date    COLONOSCOPY N/A 10/27/2017    Procedure: COLONOSCOPY;  colonoscopy;  Surgeon: Robert Monzon MD;  Location: [facility]    LAPAROSCOPIC CHOLECYSTECTOMY  3/21/2012    Procedure:LAPAROSCOPIC CHOLECYSTECTOMY; Laparoscopic Cholecystectomy; Surgeon:ROSALIE PEDROZA; Location:[facility]    SURGICAL HISTORY OF -   2005    none       Social History     Tobacco Use    Smoking status: Never    Smokeless tobacco: Never   Substance Use Topics    Alcohol use: Not Currently     Comment: rare     Family History   Problem Relation Age of Onset    Hypertension Mother     Sleep Apnea Mother     Obesity Mother     Thyroid nodules Mother     Lipids Father     Genitourinary Problems Father         prostate problems - getting biopsy to rule out cancer    Prostate Cancer Father     Esophageal Cancer Father      Other Cancer Father     Cerebrovascular Disease Maternal Grandfather         at late 50's yo    Alzheimer Disease Paternal Grandmother         at 70's yo    Heart Disease Paternal Grandfather         MI at 81 yo    Genitourinary Problems Paternal Grandfather         enlarged prostate on post-mortem autopsy    Diabetes Brother         type 1    Thyroid nodules Brother     Hypertension Maternal Aunt     Hypertension Maternal Uncle          Current Outpatient Medications   Medication Sig Dispense Refill    alcohol swab prep pads Use to swab area of injection/luis carlos as directed. 100 each 3    blood glucose (NO BRAND SPECIFIED) test strip Use to test blood sugar two times daily or as directed. To accompany: Blood Glucose Monitor Brands: per insurance. 100 strip 6    blood glucose monitoring (NO BRAND SPECIFIED) meter device kit Use to test blood sugar twice times daily or as directed. Preferred blood glucose meter OR supplies to accompany: Blood Glucose Monitor Brands: per insurance. 1 kit 0    fluticasone-salmeterol (ADVAIR HFA) 230-21 MCG/ACT inhaler INHALE 1 PUFF INTO THE LUNGS 2 TIMES DAILY 12 g 11    lisinopril (ZESTRIL) 2.5 MG tablet Take 1 tablet (2.5 mg) by mouth daily 90 tablet 3    metFORMIN (GLUCOPHAGE) 500 MG tablet Take 1 tablet (500 mg) by mouth 2 times daily (with meals) 180 tablet 3    metroNIDAZOLE (METROGEL) 0.75 % external gel Apply topically 2 times daily 45 g 3    rosuvastatin (CRESTOR) 20 MG tablet Take 1 tablet (20 mg) by mouth daily 90 tablet 3    thin (NO BRAND SPECIFIED) lancets Use with lanceting device. To accompany: Blood Glucose Monitor Brands: per insurance. 200 each 6    albuterol (PROAIR HFA/PROVENTIL HFA/VENTOLIN HFA) 108 (90 Base) MCG/ACT inhaler Inhale 2 puffs into the lungs every 6 hours (Patient not taking: Reported on 5/7/2024) 18 g 2     Allergies   Allergen Reactions    Cats Shortness Of Breath    Nkda [No Known Drug Allergy]        Review of Systems  Constitutional, HEENT,  "cardiovascular, pulmonary, GI, , musculoskeletal, neuro, skin, endocrine and psych systems are negative, except as otherwise noted.     Objective    Exam  /86   Pulse 70   Temp 97.8  F (36.6  C) (Tympanic)   Resp 20   Ht 1.753 m (5' 9\")   Wt 74.1 kg (163 lb 6.4 oz)   SpO2 93%   BMI 24.13 kg/m     Estimated body mass index is 24.13 kg/m  as calculated from the following:    Height as of this encounter: 1.753 m (5' 9\").    Weight as of this encounter: 74.1 kg (163 lb 6.4 oz).    Physical Exam  GENERAL: well-nourished,  alert and no distress, ambulatory w/o assist  EYES: no icterus; pink conjunctivae.  EYES: pink conj, no icterus.  ENT: tympanic membrane intact and non-erythematous bilaterally, moderate nasal congestion with swollen pale turbinates, throat non-erythematous, slightly raspy voice, tonsils not enlarged.  ORAL: mucosae moist   NECK: no tenderness, no adenopathy,  Thyroid not enlarged  RESP: lungs clear to auscultation - no rales, no rhonchi, no wheezes  CV: regular rates and rhythm, no murmur  MS: no edema; full range of motion x 4; no visible joint swelling; ankles with no tenderness and with grossly intact range of motion.  SKIN: one 3 mm round scaly papule with sharp border on the left lateral upper arm  NEURO: strength and tone- normal, sensory exam- grossly normal, mentation- intact, speech- normal, reflexes- symmetric  ABD:  non-tender, no hepatosplenomegaly  FOOT EXAM: no ulcer/erythema; pedal pulses strong bilaterally; monofilament: no deficit either foot         Signed Electronically by: Mendoza Carter MD    "

## 2024-05-07 NOTE — PATIENT INSTRUCTIONS
Schedule your lab tests; make sure you are fasting for more than 8 hrs.  Further recommendations when results are out.  Referrals to dermatology and allergy/asthma clinic has been signed. Schedulers will call you in the next 3-5 business days.   Be consistent with low salt, low trans fat and low saturated fat diet.  Eat food rich in omega-3-fatty acids as you tolerate. (salmon, olive oil)  Eat 5 cups of vegetables, fruits and whole grains per day.  Limit starchy food (white rice, white bread, white pasta, white potatoes) to less than a cup per meal.  Minimize sweets, junk food and fastfood. Limit soda beverages to one serving per day; best to avoid it altogether though.    Exercise: moderate intensity sustained for at least 30 mins per episode, goal of 150 mins per week at least  Combine cardiovascular and resistance exercises.  These exercise recommendations are in addition to your daily activity at work or home.  Work on losing weight.      Regular foot care; don't walk barefoot  Annual eye exam.  Please request your eye doctor to furnish a copy of the eye exam report to the clinic to be placed in your record.  Flu vaccine by the end of October yearly.  Be sure to update any other recommended vaccinations for diabetics: pneumococcal and covid-19 vaccines  Get RSV vaccine with flu shot in the fall.    Preventative Care Visits include: Yearly physicals, Well-child visits, Welcome to Medicare visits, & Medicare yearly wellness exams.    The purpose of these visits is to discuss your medical history and prevent health problems before you are sick.  You may need to pay a copay, coinsurance or deductible if your visit today includes testing or treating for a new or existing condition.    Additional charges may be incurred for today's visit. If you have questions about what your insurance plan covers, please contact your Insurance Company's member service department.  If you have questions specific to a bill you have  "already received from [employer], please contact the [employer] Billing Office at [phone].  .       Preventive Care Advice   This is general advice we often give to help people stay healthy. Your care team may have specific advice just for you. Please talk to your care team about your own preventive care needs.  Lifestyle  Exercise at least 150 minutes each week (30 minutes a day, 5 days a week).  Do muscle strengthening activities 2 days a week. These help control your weight and prevent disease.  No smoking.  Wear sunscreen to prevent skin cancer.  Have your home tested for radon every 2 to 5 years. Radon is a colorless, odorless gas that can harm your lungs. To learn more, go to www.health.[facility].[place].us and search for \"Radon in Homes.\"  Keep guns unloaded and locked up in a safe place like a safe or gun vault, or, use a gun lock and hide the keys. Always lock away bullets separately. To learn more, visit [place].gov and search for \"safe gun storage.\"  Nutrition  Eat 5 or more servings of fruits and vegetables each day.  Try wheat bread, brown rice and whole grain pasta (instead of white bread, rice, and pasta).  Get enough calcium and vitamin D. Check the label on foods and aim for 100% of the RDA (recommended daily allowance).  Regular exams  Have a dental exam and cleaning every 6 months.  See your health care team every year to talk about:  Any changes in your health.  Any medicines your care team has prescribed.  Preventive care, family planning, and ways to prevent chronic diseases.  Shots (vaccines)   HPV shots (up to age 26), if you've never had them before.  Hepatitis B shots (up to age 59), if you've never had them before.  COVID-19 shot: Get this shot when it's due.  Flu shot: Get a flu shot every year.  Tetanus shot: Get a tetanus shot every 10 years.  Pneumococcal, hepatitis A, and RSV shots: Ask your care team if you need these based on your risk.  Shingles shot (for age 50 and up).  General health " tests  Diabetes screening:  Starting at age 35, Get screened for diabetes at least every 3 years.  If you are younger than age 35, ask your care team if you should be screened for diabetes.  Cholesterol test: At age 39, start having a cholesterol test every 5 years, or more often if advised.  Bone density scan (DEXA): At age 50, ask your care team if you should have this scan for osteoporosis (brittle bones).  Hepatitis C: Get tested at least once in your life.  Abdominal aortic aneurysm screening: Talk to your doctor about having this screening if you:  Have ever smoked; and  Are biologically male; and  Are between the ages of 65 and 75.  STIs (sexually transmitted infections)  Before age 24: Ask your care team if you should be screened for STIs.  After age 24: Get screened for STIs if you're at risk. You are at risk for STIs (including HIV) if:  You are sexually active with more than one person.  You don't use condoms every time.  You or a partner was diagnosed with a sexually transmitted infection.  If you are at risk for HIV, ask about PrEP medicine to prevent HIV.  Get tested for HIV at least once in your life, whether you are at risk for HIV or not.  Cancer screening tests  Cervical cancer screening: If you have a cervix, begin getting regular cervical cancer screening tests at age 21. Most people who have regular screenings with normal results can stop after age 65. Talk about this with your provider.  Breast cancer scan (mammogram): If you've ever had breasts, begin having regular mammograms starting at age 40. This is a scan to check for breast cancer.  Colon cancer screening: It is important to start screening for colon cancer at age 45.  Have a colonoscopy test every 10 years (or more often if you're at risk) Or, ask your provider about stool tests like a FIT test every year or Cologuard test every 3 years.  To learn more about your testing options, visit: www.[employer]lyce/222244.pdf.  For help making a  decision, visit: anthony/sg40854.  Prostate cancer screening test: If you have a prostate and are age 55 to 69, ask your provider if you would benefit from a yearly prostate cancer screening test.  Lung cancer screening: If you are a current or former smoker age 50 to 80, ask your care team if ongoing lung cancer screenings are right for you.  For informational purposes only. Not to replace the advice of your health care provider. Copyright   2023 [facility]. All rights reserved. Clinically reviewed by the [facility] Transitions Program. Xtone [id number] - REV 04/24.

## 2024-05-16 ENCOUNTER — LAB (OUTPATIENT)
Dept: LAB | Facility: CLINIC | Age: 59
End: 2024-05-16
Payer: COMMERCIAL

## 2024-05-16 DIAGNOSIS — M25.572 LEFT ANKLE PAIN, UNSPECIFIED CHRONICITY: ICD-10-CM

## 2024-05-16 DIAGNOSIS — E11.65 UNCONTROLLED TYPE 2 DIABETES MELLITUS WITH HYPERGLYCEMIA (H): ICD-10-CM

## 2024-05-16 LAB
ANION GAP SERPL CALCULATED.3IONS-SCNC: 8 MMOL/L (ref 7–15)
BUN SERPL-MCNC: 13.7 MG/DL (ref 8–23)
CALCIUM SERPL-MCNC: 8.9 MG/DL (ref 8.8–10.2)
CHLORIDE SERPL-SCNC: 103 MMOL/L (ref 98–107)
CHOLEST SERPL-MCNC: 115 MG/DL
CREAT SERPL-MCNC: 1.06 MG/DL (ref 0.67–1.17)
CREAT UR-MCNC: 137.1 MG/DL
DEPRECATED HCO3 PLAS-SCNC: 28 MMOL/L (ref 22–29)
EGFRCR SERPLBLD CKD-EPI 2021: 80 ML/MIN/1.73M2
FASTING STATUS PATIENT QL REPORTED: YES
FASTING STATUS PATIENT QL REPORTED: YES
GLUCOSE SERPL-MCNC: 157 MG/DL (ref 70–99)
HBA1C MFR BLD: 7.4 % (ref 0–5.6)
HDLC SERPL-MCNC: 41 MG/DL
LDLC SERPL CALC-MCNC: 52 MG/DL
MICROALBUMIN UR-MCNC: <12 MG/L
MICROALBUMIN/CREAT UR: NORMAL MG/G{CREAT}
NONHDLC SERPL-MCNC: 74 MG/DL
POTASSIUM SERPL-SCNC: 3.9 MMOL/L (ref 3.4–5.3)
SODIUM SERPL-SCNC: 139 MMOL/L (ref 135–145)
TRIGL SERPL-MCNC: 112 MG/DL
URATE SERPL-MCNC: 4.7 MG/DL (ref 3.4–7)

## 2024-05-16 PROCEDURE — 36415 COLL VENOUS BLD VENIPUNCTURE: CPT

## 2024-05-16 PROCEDURE — 83036 HEMOGLOBIN GLYCOSYLATED A1C: CPT

## 2024-05-16 PROCEDURE — 84550 ASSAY OF BLOOD/URIC ACID: CPT

## 2024-05-16 PROCEDURE — 80061 LIPID PANEL: CPT

## 2024-05-16 PROCEDURE — 82570 ASSAY OF URINE CREATININE: CPT

## 2024-05-16 PROCEDURE — 82043 UR ALBUMIN QUANTITATIVE: CPT

## 2024-05-16 PROCEDURE — 80048 BASIC METABOLIC PNL TOTAL CA: CPT

## 2024-05-17 ENCOUNTER — MYC MEDICAL ADVICE (OUTPATIENT)
Dept: FAMILY MEDICINE | Facility: CLINIC | Age: 59
End: 2024-05-17
Payer: COMMERCIAL

## 2024-05-17 DIAGNOSIS — E11.65 UNCONTROLLED TYPE 2 DIABETES MELLITUS WITH HYPERGLYCEMIA (H): ICD-10-CM

## 2024-05-17 DIAGNOSIS — J45.30 MILD PERSISTENT ASTHMA WITHOUT COMPLICATION: ICD-10-CM

## 2024-05-17 RX ORDER — ROSUVASTATIN CALCIUM 20 MG/1
20 TABLET, COATED ORAL DAILY
Qty: 90 TABLET | Refills: 2 | Status: SHIPPED | OUTPATIENT
Start: 2024-05-17

## 2024-05-17 RX ORDER — LISINOPRIL 2.5 MG/1
2.5 TABLET ORAL DAILY
Qty: 90 TABLET | Refills: 2 | Status: SHIPPED | OUTPATIENT
Start: 2024-05-17

## 2024-05-20 RX ORDER — FLUTICASONE PROPIONATE 50 MCG
1 SPRAY, SUSPENSION (ML) NASAL DAILY
Qty: 16 G | Refills: 11 | Status: SHIPPED | OUTPATIENT
Start: 2024-05-20

## 2024-05-20 RX ORDER — ALBUTEROL SULFATE 90 UG/1
2 AEROSOL, METERED RESPIRATORY (INHALATION) EVERY 6 HOURS
Qty: 18 G | Refills: 2 | Status: SHIPPED | OUTPATIENT
Start: 2024-05-20

## 2024-05-20 RX ORDER — FLUTICASONE PROPIONATE AND SALMETEROL XINAFOATE 230; 21 UG/1; UG/1
AEROSOL, METERED RESPIRATORY (INHALATION)
Qty: 12 G | Refills: 11 | Status: SHIPPED | OUTPATIENT
Start: 2024-05-20 | End: 2024-09-09

## 2024-05-21 ENCOUNTER — TELEPHONE (OUTPATIENT)
Dept: FAMILY MEDICINE | Facility: CLINIC | Age: 59
End: 2024-05-21
Payer: COMMERCIAL

## 2024-05-21 NOTE — TELEPHONE ENCOUNTER
Prior Authorization required on Fluticasone-salmeterol 230-21  Insurance Phone: [phone]  Patient ID: [id number]  Please contact the pharmacy with Prior Auth status (approved/denied).    Thanks,  Guy Dewey   [facility]  [phone]

## 2024-06-01 NOTE — TELEPHONE ENCOUNTER
PA Initiation    Medication: ADVAIR -21 MCG/ACT IN AERO  Insurance Company: [employer] - Phone [phone] Fax [phone]  Pharmacy Filling the Rx: [facility] [place], [place] - [facility] [facility]  Filling Pharmacy Phone: [phone]  Filling Pharmacy Fax: [phone]  Start Date: 6/1/2024     Pt here for C7D1.   Arrives Ambulating independently, accompanied by Spouse           Patient reports possible pregnancy since last therapy cycle: No    Modifications in dose or schedule: No     Frequency of blood return and site check throughout [facility]

## 2024-06-03 NOTE — TELEPHONE ENCOUNTER
Prior Authorization Not Processed    Medication: ADVAIR -21 MCG/ACT IN AERO  Insurance Company: [employer] - Phone [phone] Fax [phone]  Expected CoPay: $    Pharmacy Filling the Rx: [facility] - [place], [place] - [address]  Pharmacy Notified:   Patient Notified:     Pharmacy will contact patient for new insurance info.  Patient's MA plan is inactive as of 05/31/2024

## 2024-06-24 DIAGNOSIS — E11.65 UNCONTROLLED TYPE 2 DIABETES MELLITUS WITH HYPERGLYCEMIA (H): ICD-10-CM

## 2024-09-01 ENCOUNTER — HEALTH MAINTENANCE LETTER (OUTPATIENT)
Age: 59
End: 2024-09-01

## 2024-09-08 ASSESSMENT — ASTHMA QUESTIONNAIRES
QUESTION_1 LAST FOUR WEEKS HOW MUCH OF THE TIME DID YOUR ASTHMA KEEP YOU FROM GETTING AS MUCH DONE AT WORK, SCHOOL OR AT HOME: SOME OF THE TIME
ACT_TOTALSCORE: 18
QUESTION_2 LAST FOUR WEEKS HOW OFTEN HAVE YOU HAD SHORTNESS OF BREATH: ONCE OR TWICE A WEEK
QUESTION_5 LAST FOUR WEEKS HOW WOULD YOU RATE YOUR ASTHMA CONTROL: SOMEWHAT CONTROLLED
QUESTION_3 LAST FOUR WEEKS HOW OFTEN DID YOUR ASTHMA SYMPTOMS (WHEEZING, COUGHING, SHORTNESS OF BREATH, CHEST TIGHTNESS OR PAIN) WAKE YOU UP AT NIGHT OR EARLIER THAN USUAL IN THE MORNING: NOT AT ALL
QUESTION_4 LAST FOUR WEEKS HOW OFTEN HAVE YOU USED YOUR RESCUE INHALER OR NEBULIZER MEDICATION (SUCH AS ALBUTEROL): TWO OR THREE TIMES PER WEEK
ACT_TOTALSCORE: 18

## 2024-09-09 ENCOUNTER — OFFICE VISIT (OUTPATIENT)
Dept: FAMILY MEDICINE | Facility: CLINIC | Age: 59
End: 2024-09-09
Payer: COMMERCIAL

## 2024-09-09 VITALS
RESPIRATION RATE: 16 BRPM | HEART RATE: 71 BPM | OXYGEN SATURATION: 92 % | BODY MASS INDEX: 24.22 KG/M2 | WEIGHT: 163.5 LBS | HEIGHT: 69 IN | DIASTOLIC BLOOD PRESSURE: 94 MMHG | TEMPERATURE: 97.9 F | SYSTOLIC BLOOD PRESSURE: 156 MMHG

## 2024-09-09 DIAGNOSIS — B37.0 THRUSH: Primary | ICD-10-CM

## 2024-09-09 PROCEDURE — 90677 PCV20 VACCINE IM: CPT | Performed by: NURSE PRACTITIONER

## 2024-09-09 PROCEDURE — 99213 OFFICE O/P EST LOW 20 MIN: CPT | Mod: 25 | Performed by: NURSE PRACTITIONER

## 2024-09-09 PROCEDURE — 90471 IMMUNIZATION ADMIN: CPT | Performed by: NURSE PRACTITIONER

## 2024-09-09 RX ORDER — FLUCONAZOLE 100 MG/1
100 TABLET ORAL DAILY
Qty: 15 TABLET | Refills: 0 | Status: SHIPPED | OUTPATIENT
Start: 2024-09-09 | End: 2024-09-24

## 2024-09-09 ASSESSMENT — PAIN SCALES - GENERAL: PAINLEVEL: NO PAIN (0)

## 2024-09-09 NOTE — NURSING NOTE
Prior to immunization administration, verified patients identity using patient s name and date of birth. Please see Immunization Activity for additional information.     Screening Questionnaire for Adult Immunization    Are you sick today?   No   Do you have allergies to medications, food, a vaccine component or latex?   No   Have you ever had a serious reaction after receiving a vaccination?   No   Do you have a long-term health problem with heart, lung, kidney, or metabolic disease (e.g., diabetes), asthma, a blood disorder, no spleen, complement component deficiency, a cochlear implant, or a spinal fluid leak?  Are you on long-term aspirin therapy?   Yes   Do you have cancer, leukemia, HIV/AIDS, or any other immune system problem?   No   Do you have a parent, brother, or sister with an immune system problem?   No   In the past 3 months, have you taken medications that affect  your immune system, such as prednisone, other steroids, or anticancer drugs; drugs for the treatment of rheumatoid arthritis, Crohn s disease, or psoriasis; or have you had radiation treatments?   No   Have you had a seizure, or a brain or other nervous system problem?   No   During the past year, have you received a transfusion of blood or blood    products, or been given immune (gamma) globulin or antiviral drug?   No   For women: Are you pregnant or is there a chance you could become       pregnant during the next month?   No   Have you received any vaccinations in the past 4 weeks?   No     Immunization questionnaire was positive for at least one answer.  Not contraindicated to vaccine given.      Patient instructed to remain in clinic for 15 minutes afterwards, and to report any adverse reactions.     Screening performed by Anjelica Bonilla CMA on 9/9/2024 at 8:58 AM.

## 2024-09-09 NOTE — PROGRESS NOTES
Assessment & Plan     Thrush  Treat with:  - fluconazole (DIFLUCAN) 100 MG tablet; Take 1 tablet (100 mg) by mouth daily for 15 days.  Advised avoiding added sugars in diet.  Continue to rinse mouth after each inhaler use.      Patient receiving pneumococcal vaccine today.  He would like to come back for the RSV vaccine on a different day - he may schedule this on the MA schedule at his convenience.     The risks, benefits and treatment options of prescribed medications or other treatments have been discussed with the patient. The patient verbalized their understanding and should call or follow up if no improvement or if they develop further problems.  Ana Laboy, NORY                    Subjective   Dillan is a 58 year old, presenting for the following health issues:  Mouth/Lip Problem (Thrush, has had on and off x 3 years.  Does has asthma, on daily steroid inhaler, does rinse mouth after use.  Tongue and roof of mouth feel numb.  Discoloration of tongue, occasionally will have some of the white film.  )        9/9/2024     8:42 AM   Additional Questions   Roomed by Anjelica ONEIL   Accompanied by stefany     History of Present Illness       Reason for visit:  Oral thrushHe consumes 2 sweetened beverage(s) daily.He exercises with enough effort to increase his heart rate 20 to 29 minutes per day.  He exercises with enough effort to increase his heart rate 5 days per week. He is missing 1 dose(s) of medications per week.  He is not taking prescribed medications regularly due to remembering to take.         Chief Complaint   Patient presents with    Mouth/Lip Problem     Thrush, has had on and off x 3 years.  Does has asthma, on daily steroid inhaler, does rinse mouth after use.  Tongue and roof of mouth feel numb.  Discoloration of tongue, occasionally will have some of the white film.       Also has diabetes. Current fasting glucose is 120-140  Has one Mellow Yellow every morning, otherwise denies added sugars in  "diet.          Review of Systems  Constitutional, HEENT, cardiovascular, pulmonary, gi and gu systems are negative, except as otherwise noted.      Objective    BP (!) 156/94 (BP Location: Right arm, Patient Position: Chair, Cuff Size: Adult Regular)   Pulse 71   Temp 97.9  F (36.6  C) (Tympanic)   Resp 16   Ht 1.759 m (5' 9.25\")   Wt 74.2 kg (163 lb 8 oz)   SpO2 92%   BMI 23.97 kg/m    Body mass index is 23.97 kg/m .  Physical Exam   GENERAL: alert and no distress  HENT: fissured tongue - white film noted on posterior tongue            Signed Electronically by: STAR Diaz CNP    "

## 2024-10-29 ENCOUNTER — TRANSFERRED RECORDS (OUTPATIENT)
Dept: HEALTH INFORMATION MANAGEMENT | Facility: CLINIC | Age: 59
End: 2024-10-29
Payer: COMMERCIAL

## 2024-10-29 LAB — RETINOPATHY: NEGATIVE

## 2024-11-11 ENCOUNTER — TELEPHONE (OUTPATIENT)
Dept: FAMILY MEDICINE | Facility: CLINIC | Age: 59
End: 2024-11-11
Payer: COMMERCIAL

## 2024-11-11 NOTE — TELEPHONE ENCOUNTER
Patient is on CMA schedule 11/18 for RSV vaccine. Please review and place order if ok. Thank you - Eliot LAZARO CMA

## 2025-01-04 ENCOUNTER — HEALTH MAINTENANCE LETTER (OUTPATIENT)
Age: 60
End: 2025-01-04

## 2025-03-18 DIAGNOSIS — E11.65 UNCONTROLLED TYPE 2 DIABETES MELLITUS WITH HYPERGLYCEMIA (H): ICD-10-CM

## 2025-03-18 RX ORDER — LISINOPRIL 2.5 MG/1
2.5 TABLET ORAL DAILY
Qty: 90 TABLET | Refills: 0 | Status: SHIPPED | OUTPATIENT
Start: 2025-03-18

## 2025-03-18 RX ORDER — ROSUVASTATIN CALCIUM 20 MG/1
20 TABLET, COATED ORAL DAILY
Qty: 90 TABLET | Refills: 2 | Status: SHIPPED | OUTPATIENT
Start: 2025-03-18

## 2025-04-07 ENCOUNTER — PATIENT OUTREACH (OUTPATIENT)
Dept: CARE COORDINATION | Facility: CLINIC | Age: 60
End: 2025-04-07
Payer: COMMERCIAL

## 2025-04-19 ENCOUNTER — HEALTH MAINTENANCE LETTER (OUTPATIENT)
Age: 60
End: 2025-04-19

## 2025-04-21 ENCOUNTER — PATIENT OUTREACH (OUTPATIENT)
Dept: CARE COORDINATION | Facility: CLINIC | Age: 60
End: 2025-04-21
Payer: COMMERCIAL

## 2025-05-22 ENCOUNTER — OFFICE VISIT (OUTPATIENT)
Dept: FAMILY MEDICINE | Facility: CLINIC | Age: 60
End: 2025-05-22
Attending: FAMILY MEDICINE
Payer: COMMERCIAL

## 2025-05-22 VITALS
TEMPERATURE: 98.6 F | BODY MASS INDEX: 24.59 KG/M2 | RESPIRATION RATE: 20 BRPM | WEIGHT: 166 LBS | SYSTOLIC BLOOD PRESSURE: 126 MMHG | HEART RATE: 76 BPM | HEIGHT: 69 IN | DIASTOLIC BLOOD PRESSURE: 88 MMHG | OXYGEN SATURATION: 93 %

## 2025-05-22 DIAGNOSIS — E11.65 UNCONTROLLED TYPE 2 DIABETES MELLITUS WITH HYPERGLYCEMIA (H): ICD-10-CM

## 2025-05-22 DIAGNOSIS — L81.9 HYPERPIGMENTATION: ICD-10-CM

## 2025-05-22 DIAGNOSIS — Z00.00 ROUTINE GENERAL MEDICAL EXAMINATION AT A HEALTH CARE FACILITY: Primary | ICD-10-CM

## 2025-05-22 DIAGNOSIS — J45.30 MILD PERSISTENT ASTHMA WITHOUT COMPLICATION: ICD-10-CM

## 2025-05-22 DIAGNOSIS — L82.1 SEBORRHEIC KERATOSES: ICD-10-CM

## 2025-05-22 DIAGNOSIS — Z12.5 SCREENING FOR PROSTATE CANCER: ICD-10-CM

## 2025-05-22 RX ORDER — LISINOPRIL 2.5 MG/1
2.5 TABLET ORAL DAILY
Qty: 90 TABLET | Refills: 3 | Status: SHIPPED | OUTPATIENT
Start: 2025-05-22

## 2025-05-22 RX ORDER — ALBUTEROL SULFATE 90 UG/1
2 INHALANT RESPIRATORY (INHALATION) EVERY 6 HOURS
Qty: 18 G | Refills: 2 | Status: SHIPPED | OUTPATIENT
Start: 2025-05-22

## 2025-05-22 SDOH — HEALTH STABILITY: PHYSICAL HEALTH: ON AVERAGE, HOW MANY MINUTES DO YOU ENGAGE IN EXERCISE AT THIS LEVEL?: 30 MIN

## 2025-05-22 SDOH — HEALTH STABILITY: PHYSICAL HEALTH: ON AVERAGE, HOW MANY DAYS PER WEEK DO YOU ENGAGE IN MODERATE TO STRENUOUS EXERCISE (LIKE A BRISK WALK)?: 5 DAYS

## 2025-05-22 ASSESSMENT — ASTHMA QUESTIONNAIRES
QUESTION_2 LAST FOUR WEEKS HOW OFTEN HAVE YOU HAD SHORTNESS OF BREATH: MORE THAN ONCE A DAY
QUESTION_1 LAST FOUR WEEKS HOW MUCH OF THE TIME DID YOUR ASTHMA KEEP YOU FROM GETTING AS MUCH DONE AT WORK, SCHOOL OR AT HOME: SOME OF THE TIME
QUESTION_3 LAST FOUR WEEKS HOW OFTEN DID YOUR ASTHMA SYMPTOMS (WHEEZING, COUGHING, SHORTNESS OF BREATH, CHEST TIGHTNESS OR PAIN) WAKE YOU UP AT NIGHT OR EARLIER THAN USUAL IN THE MORNING: NOT AT ALL
ACT_TOTALSCORE: 19
QUESTION_4 LAST FOUR WEEKS HOW OFTEN HAVE YOU USED YOUR RESCUE INHALER OR NEBULIZER MEDICATION (SUCH AS ALBUTEROL): ONE OR TWO TIMES PER DAY
QUESTION_5 LAST FOUR WEEKS HOW WOULD YOU RATE YOUR ASTHMA CONTROL: SOMEWHAT CONTROLLED

## 2025-05-22 ASSESSMENT — SOCIAL DETERMINANTS OF HEALTH (SDOH): HOW OFTEN DO YOU GET TOGETHER WITH FRIENDS OR RELATIVES?: PATIENT DECLINED

## 2025-05-22 ASSESSMENT — PAIN SCALES - GENERAL: PAINLEVEL_OUTOF10: NO PAIN (0)

## 2025-05-22 NOTE — PATIENT INSTRUCTIONS
Patient Education     RSV vaccine in fall with flu shot.  Get Covid19 vaccine ASAP.    Be consistent with low salt, low trans fat and low saturated fat diet.  Eat food rich in omega-3-fatty acids as you tolerate. (salmon, olive oil)  Eat 5 cups of vegetables, fruits and whole grains per day.  Limit starchy food (white rice, white bread, white pasta, white potatoes) to less than a cup per meal.  Minimize sweets, junk food and fastfood. Limit soda beverages to one serving per day; best to avoid it altogether though.    Exercise: moderate intensity sustained for at least 30 mins per episode, goal of 150 mins per week at least  Combine cardiovascular and resistance exercises.  These exercise recommendations are in addition to your daily activity at work or home.  Work on losing weight.      Regular foot care; don't walk barefoot  Annual eye exam.  Please request your eye doctor to furnish a copy of the eye exam report to the clinic to be placed in your record.  Flu vaccine by the end of October yearly.  Be sure to update any other recommended vaccinations for diabetics.    Blood tests: schedule fasting lab appointment     Preventative Care Visits include: Yearly physicals, Well-child visits, Welcome to Medicare visits, & Medicare yearly wellness exams.    The purpose of these visits is to discuss your medical history and prevent health problems before you are sick.  You may need to pay a copay, coinsurance or deductible if your visit today includes testing or treating for a new or existing condition.    Additional charges may be incurred for today's visit. If you have questions about what your insurance plan covers, please contact your Insurance Company's member service department.  If you have questions specific to a bill you have already received from [employer], please contact the Corporate Billing Office at [phone].    Preventive Care Advice   This is general advice given by our system to help you stay healthy.  However, your care team may have specific advice just for you. Please talk to your care team about your preventive care needs.  Nutrition  Eat 5 or more servings of fruits and vegetables each day.  Try wheat bread, brown rice and whole grain pasta (instead of white bread, rice, and pasta).  Get enough calcium and vitamin D. Check the label on foods and aim for 100% of the RDA (recommended daily allowance).  Lifestyle  Exercise at least 150 minutes each week  (30 minutes a day, 5 days a week).  Do muscle strengthening activities 2 days a week. These help control your weight and prevent disease.  No smoking.  Wear sunscreen to prevent skin cancer.  Have a dental exam and cleaning every 6 months.  Yearly exams  See your health care team every year to talk about:  Any changes in your health.  Any medicines your care team has prescribed.  Preventive care, family planning, and ways to prevent chronic diseases.  Shots (vaccines)   HPV shots (up to age 26), if you've never had them before.  Hepatitis B shots (up to age 59), if you've never had them before.  COVID-19 shot: Get this shot when it's due.  Flu shot: Get a flu shot every year.  Tetanus shot: Get a tetanus shot every 10 years.  Pneumococcal, hepatitis A, and RSV shots: Ask your care team if you need these based on your risk.  Shingles shot (for age 50 and up)  General health tests  Diabetes screening:  Starting at age 35, Get screened for diabetes at least every 3 years.  If you are younger than age 35, ask your care team if you should be screened for diabetes.  Cholesterol test: At age 39, start having a cholesterol test every 5 years, or more often if advised.  Bone density scan (DEXA): At age 50, ask your care team if you should have this scan for osteoporosis (brittle bones).  Hepatitis C: Get tested at least once in your life.  STIs (sexually transmitted infections)  Before age 24: Ask your care team if you should be screened for STIs.  After age 24: Get  screened for STIs if you're at risk. You are at risk for STIs (including HIV) if:  You are sexually active with more than one person.  You don't use condoms every time.  You or a partner was diagnosed with a sexually transmitted infection.  If you are at risk for HIV, ask about PrEP medicine to prevent HIV.  Get tested for HIV at least once in your life, whether you are at risk for HIV or not.  Cancer screening tests  Cervical cancer screening: If you have a cervix, begin getting regular cervical cancer screening tests starting at age 21.  Breast cancer scan (mammogram): If you've ever had breasts, begin having regular mammograms starting at age 40. This is a scan to check for breast cancer.  Colon cancer screening: It is important to start screening for colon cancer at age 45.  Have a colonoscopy test every 10 years (or more often if you're at risk) Or, ask your provider about stool tests like a FIT test every year or Cologuard test every 3 years.  To learn more about your testing options, visit:   .  For help making a decision, visit:   https://bit.ly/ac05136.  Prostate cancer screening test: If you have a prostate, ask your care team if a prostate cancer screening test (PSA) at age 55 is right for you.  Lung cancer screening: If you are a current or former smoker ages 50 to 80, ask your care team if ongoing lung cancer screenings are right for you.  For informational purposes only. Not to replace the advice of your health care provider. Copyright   2023 [facility] [employer]. All rights reserved. Clinically reviewed by the [facility] Transitions Program. Drive [id number] - REV 01/24.

## 2025-05-22 NOTE — PROGRESS NOTES
Preventive Care Visit  [facility]  Mendoza Carter MD, Family Medicine  May 22, 2025      Assessment & Plan     Routine general medical examination at a health care facility  Patient was advised on recommended screening and preventive health recommendations.  He verbalized understanding and agreed to the plans below.   He declined covid19 vaccine today -he said he felt bad for a few days after getting his vaccines before. Advised to reconsider due to his high risk conditions.  Rsv vaccine in the fall.    Uncontrolled type 2 diabetes mellitus with hyperglycemia (H)  Repeat blood test scheduled for when patient is fasting. Based on report, his control likely has improved compared to last year.  Keep current regimen for now. When refilled, change metformin to extended release per patient request.  Reinforced carbohydrate control.  Regular exercise reinforced.  Regular foot care, annual eye exam.  Flu vaccine every year.   - HEMOGLOBIN A1C  - BASIC METABOLIC PANEL  - Lipid panel reflex to direct LDL Fasting  - Albumin Random Urine Quantitative with Creat Ratio  - lisinopril (ZESTRIL) 2.5 MG tablet  Dispense: 90 tablet; Refill: 3  - OFFICE/OUTPT VISIT,EST,LEVL IV  - FOOT EXAM    Mild persistent asthma without complication  Borderline control. Based on repeated ACT.  Repeat ACT in 2 months.  Adjust treatment as appropriate.  - albuterol (PROAIR HFA/PROVENTIL HFA/VENTOLIN HFA) 108 (90 Base) MCG/ACT inhaler  Dispense: 18 g; Refill: 2  - OFFICE/OUTPT VISIT,EST,LEVL IV    Seborrheic keratoses  Discussed with patient cause, course and treatment of condition.  Patient concurred with  expectant management.  Return precautions discussed and given to patient.      Hyperpigmentation  Unclear cause. Patient verified it ha sbeen gradually fading.  Observe if resolving over next few weeks.  Reassess if persistent after a month.    Screening for prostate cancer  - Prostate Specific Antigen Screen    The  longitudinal plan of care for the diagnosis(es)/condition(s) as documented were addressed during this visit. Due to the added complexity in care, I will continue to support Dillan in the subsequent management and with ongoing continuity of care.     Patient has been advised of split billing requirements and indicates understanding: Yes        Counseling  Appropriate preventive services were addressed with this patient via screening, questionnaire, or discussion as appropriate for fall prevention, nutrition, physical activity, Tobacco-use cessation, social engagement, weight loss and cognition.  Checklist reviewing preventive services available has been given to the patient.  Reviewed patient's diet, addressing concerns and/or questions.       Follow-up   Return in about 1 year (around 5/22/2026) for In-person visit for next wellness exam.     Follow-up Visit   Expected date:  May 22, 2026 (Approximate)      Follow Up Appointment Details:     Follow-up with whom?: PCP    Follow-Up for what?: Adult Preventive    How?: In Person                 Subjective   Dillan is a 59 year old, presenting for the following:  Physical, Diabetes, Hypertension, and Lipids        5/22/2025    10:19 AM   Additional Questions   Roomed by Laya Suarez CMA   Accompanied by SELF        HPI     Diabetes Follow-up    How often are you checking your blood sugar? A few times a month  What time of day are you checking your blood sugars (select all that apply)?  Before meals  Have you had any blood sugars above 200?  No  Have you had any blood sugars below 70?  No  What symptoms do you notice when your blood sugar is low?  None and Not applicable  What concerns do you have today about your diabetes? Other: Would like to discuss Metformin   Do you have any of these symptoms? (Select all that apply)  Numbness in feet        Hyperlipidemia Follow-Up    Are you regularly taking any medication or supplement to lower your cholesterol?   Yes-  rosuvastatin  Are you having muscle aches or other side effects that you think could be caused by your cholesterol lowering medication?  No    Hypertension Follow-up    Do you check your blood pressure regularly outside of the clinic? No   Are you following a low salt diet? No  Are your blood pressures ever more than 140 on the top number (systolic) OR more   than 90 on the bottom number (diastolic), for example 140/90? Yes    BP Readings from Last 2 Encounters:   05/22/25 126/88   09/09/24 (!) 156/94     Hemoglobin A1C (%)   Date Value   05/16/2024 7.4 (H)   05/15/2023 7.8 (H)   10/29/2019 6.6 (H)   03/01/2012 5.3     LDL Cholesterol Calculated (mg/dL)   Date Value   05/16/2024 52   05/15/2023 63   10/29/2019 107 (H)   03/12/2014 86     Patient is not fasting today.    Asthma      5/22/2025     7:56 AM   ACT Total Scores   ACT TOTAL SCORE (Goal Greater than or Equal to 20) 14    In the past 12 months, how many times did you visit the emergency room for your asthma without being admitted to the hospital? 0   In the past 12 months, how many times were you hospitalized overnight because of your asthma? 0       Patient-reported   Uses albuterol half hour before administering dulera.    Do you have any of the following symptoms? Trouble with breathing (shortness of breath) - breathes heavily when exerting - stable  What makes your asthma/breathing worse?  Exercise or sports  Do you want more information about how to use your inhaler? No      Advance Care Planning    Discussed advance care planning with patient; informed AVS has link to [facility].        5/22/2025   General Health   How would you rate your overall physical health? Good   Feel stress (tense, anxious, or unable to sleep) Only a little   (!) STRESS CONCERN      5/22/2025   Nutrition   Three or more servings of calcium each day? Yes   Diet: Regular (no restrictions)   How many servings of fruit and vegetables per day? (!) 2-3   How many sweetened  beverages each day? 0-1         5/22/2025   Exercise   Days per week of moderate/strenous exercise 5 days   Average minutes spent exercising at this level 30 min         5/22/2025   Social Factors   Frequency of gathering with friends or relatives Patient declined   Worry food won't last until get money to buy more No   Food not last or not have enough money for food? No   Do you have housing? (Housing is defined as stable permanent housing and does not include staying outside in a car, in a tent, in an abandoned building, in an overnight shelter, or couch-surfing.) Yes   Are you worried about losing your housing? No   Lack of transportation? No   Unable to get utilities (heat,electricity)? No         5/22/2025   Fall Risk   Fallen 2 or more times in the past year? No   Trouble with walking or balance? No          5/22/2025   Dental   Dentist two times every year? Yes         Today's PHQ-2 Score:       5/22/2025     7:54 AM   PHQ-2 ( 1999 [employer])   Q1: Little interest or pleasure in doing things 0   Q2: Feeling down, depressed or hopeless 0   PHQ-2 Score 0    Q1: Little interest or pleasure in doing things Not at all   Q2: Feeling down, depressed or hopeless Not at all   PHQ-2 Score 0       Patient-reported           5/22/2025   Substance Use   Alcohol more than 3/day or more than 7/wk No   Do you use any other substances recreationally? No     Social History     Tobacco Use    Smoking status: Never    Smokeless tobacco: Never   Vaping Use    Vaping status: Never Used   Substance Use Topics    Alcohol use: Not Currently     Comment: rare    Drug use: No           5/22/2025   STI Screening   New sexual partner(s) since last STI/HIV test? No   Last PSA:   PSA   Date Value Ref Range Status   10/29/2019 1.14 0 - 4 ug/L Final     Comment:     Assay Method:  Chemiluminescence using Siemens Vista analyzer     Prostate Specific Antigen Screen   Date Value Ref Range Status   06/20/2022 0.95 0.00 - 4.00 ug/L Final     ASCVD  Risk   The ASCVD Risk score (Sidra NGUYEN, et al., 2019) failed to calculate for the following reasons:    The valid total cholesterol range is 130 to 320 mg/dL           Reviewed and updated as needed this visit by Provider                    Past Medical History:   Diagnosis Date    Calculus of gallbladder with other cholecystitis, without mention of obstruction 03/15/2012    Cholelithiasis 03/12/2012    [facility] update changed this record. Please review for accuracy    Diabetes (H) 12/2019    NO ACTIVE PROBLEMS     Uncomplicated asthma Sept 2020     Past Surgical History:   Procedure Laterality Date    COLONOSCOPY N/A 10/27/2017    Procedure: COLONOSCOPY;  colonoscopy;  Surgeon: Robert Monzon MD;  Location: [facility]    LAPAROSCOPIC CHOLECYSTECTOMY  3/21/2012    Procedure:LAPAROSCOPIC CHOLECYSTECTOMY; Laparoscopic Cholecystectomy; Surgeon:ROSALIE PEDROZA; Location:[facility]    SURGICAL HISTORY OF -   2005    none     Patient Active Problem List   Diagnosis    Hyperlipidemia    Type 2 diabetes mellitus without complication, without long-term current use of insulin (H)    Mild persistent asthma without complication    Uncontrolled type 2 diabetes mellitus with hyperglycemia (H)     Past Surgical History:   Procedure Laterality Date    COLONOSCOPY N/A 10/27/2017    Procedure: COLONOSCOPY;  colonoscopy;  Surgeon: Robert Monzon MD;  Location: [facility]    LAPAROSCOPIC CHOLECYSTECTOMY  3/21/2012    Procedure:LAPAROSCOPIC CHOLECYSTECTOMY; Laparoscopic Cholecystectomy; Surgeon:ROSALIE PEDROZA; Location:[facility]    SURGICAL HISTORY OF -   2005    none       Social History     Tobacco Use    Smoking status: Never    Smokeless tobacco: Never   Substance Use Topics    Alcohol use: Not Currently     Comment: rare     Family History   Problem Relation Age of Onset    Hypertension Mother     Sleep Apnea Mother     Obesity Mother     Thyroid nodules Mother     Lipids Father     Genitourinary Problems  Father         prostate problems - getting biopsy to rule out cancer    Prostate Cancer Father     Esophageal Cancer Father     Other Cancer Father     Cerebrovascular Disease Maternal Grandfather         at late 50's yo    Alzheimer Disease Paternal Grandmother         at 70's yo    Heart Disease Paternal Grandfather         MI at 79 yo    Genitourinary Problems Paternal Grandfather         enlarged prostate on post-mortem autopsy    Diabetes Brother         type 1    Thyroid nodules Brother     Hypertension Maternal Aunt     Hypertension Maternal Uncle          Current Outpatient Medications   Medication Sig Dispense Refill    albuterol (PROAIR HFA/PROVENTIL HFA/VENTOLIN HFA) 108 (90 Base) MCG/ACT inhaler Inhale 2 puffs into the lungs every 6 hours 18 g 2    alcohol swab prep pads Use to swab area of injection/lusi carlos as directed. 100 each 3    blood glucose (NO BRAND SPECIFIED) test strip Use to test blood sugar two times daily or as directed. To accompany: Blood Glucose Monitor Brands: per insurance. 100 strip 6    blood glucose monitoring (NO BRAND SPECIFIED) meter device kit Use to test blood sugar twice times daily or as directed. Preferred blood glucose meter OR supplies to accompany: Blood Glucose Monitor Brands: per insurance. 1 kit 0    fluticasone (FLONASE) 50 MCG/ACT nasal spray Spray 1 spray into both nostrils daily 16 g 11    lisinopril (ZESTRIL) 2.5 MG tablet TAKE ONE TABLET BY MOUTH ONCE DAILY 90 tablet 0    metFORMIN (GLUCOPHAGE) 850 MG tablet Take 1 tablet (850 mg) by mouth 2 times daily (with meals) 180 tablet 3    metroNIDAZOLE (METROGEL) 0.75 % external gel Apply topically 2 times daily 45 g 3    mometasone-formoterol (DULERA) 200-5 MCG/ACT inhaler Inhale 2 puffs into the lungs 2 times daily 36 g 3    rosuvastatin (CRESTOR) 20 MG tablet TAKE ONE TABLET BY MOUTH ONCE DAILY 90 tablet 2    thin (NO BRAND SPECIFIED) lancets Use with lanceting device. To accompany: Blood Glucose Monitor Brands: per  "insurance. 200 each 6     Allergies   Allergen Reactions    Cats Shortness Of Breath    Nkda [No Known Drug Allergy]          Review of Systems  Constitutional, HEENT, cardiovascular, pulmonary, GI, , musculoskeletal, neuro, skin, endocrine and psych systems are negative, except as otherwise noted.     Objective    Exam  /88   Pulse 76   Temp 98.6  F (37  C) (Tympanic)   Resp 20   Ht 1.759 m (5' 9.25\")   Wt 75.3 kg (166 lb)   SpO2 93%   BMI 24.34 kg/m     Estimated body mass index is 24.34 kg/m  as calculated from the following:    Height as of this encounter: 1.759 m (5' 9.25\").    Weight as of this encounter: 75.3 kg (166 lb).    Physical Exam  GENERAL APPEARANCE: well-nourished, alert and no distress  EYES: pink conj, no icterus, PERRL, EOMI  HENT: ear canals and TM's normal, nose clear,  mouth without ulcers or lesions, oropharynx clear and oral mucous membranes moist  NECK: no adenopathy, no asymmetry, masses, or scars and thyroid normal to palpation  RESP: lungs clear to auscultation - no rales, rhonchi or wheezes  CV: normal rate, regular rhythm, no murmur,   ABDOMEN: soft, nontender, no hepatosplenomegaly, no masses and bowel sounds normal  DIRECT RECTAL EXAM: deferred  MS: no musculoskeletal defects are noted and gait is age appropriate without ataxia; no edema  SKIN: scattered various sized dry, leathery stuck-on papules on face and torso consistent with SK; bilateral cheeks with erythematous papules and blotchy hyperemia (rosacea?); brownish blotchy hyperpigmentation on flexzor aspect of left lower extremity from distal thigh to mid-calf and right lower extremity on proximal calf  NEURO: Normal strength and tone, sensory exam grossly normal, mentation intact and speech normal   FOOT EXAM: no ulcer/erythema; pedal pulses strong bilaterally; monofilament: no deficit either foot        Signed Electronically by: Mendoza Carter MD    "

## 2025-05-29 ENCOUNTER — LAB (OUTPATIENT)
Dept: LAB | Facility: CLINIC | Age: 60
End: 2025-05-29
Payer: COMMERCIAL

## 2025-05-29 ENCOUNTER — RESULTS FOLLOW-UP (OUTPATIENT)
Dept: FAMILY MEDICINE | Facility: CLINIC | Age: 60
End: 2025-05-29

## 2025-05-29 DIAGNOSIS — E11.65 UNCONTROLLED TYPE 2 DIABETES MELLITUS WITH HYPERGLYCEMIA (H): ICD-10-CM

## 2025-05-29 DIAGNOSIS — R79.89 ELEVATED SERUM CREATININE: Primary | ICD-10-CM

## 2025-05-29 DIAGNOSIS — Z12.5 SCREENING FOR PROSTATE CANCER: ICD-10-CM

## 2025-05-29 LAB
ANION GAP SERPL CALCULATED.3IONS-SCNC: 11 MMOL/L (ref 7–15)
BUN SERPL-MCNC: 18.6 MG/DL (ref 8–23)
CALCIUM SERPL-MCNC: 9.7 MG/DL (ref 8.8–10.4)
CHLORIDE SERPL-SCNC: 99 MMOL/L (ref 98–107)
CHOLEST SERPL-MCNC: 144 MG/DL
CREAT SERPL-MCNC: 1.18 MG/DL (ref 0.67–1.17)
CREAT UR-MCNC: 303.4 MG/DL
EGFRCR SERPLBLD CKD-EPI 2021: 70 ML/MIN/1.73M2
EST. AVERAGE GLUCOSE BLD GHB EST-MCNC: 183 MG/DL
FASTING STATUS PATIENT QL REPORTED: ABNORMAL
FASTING STATUS PATIENT QL REPORTED: NORMAL
GLUCOSE SERPL-MCNC: 162 MG/DL (ref 70–99)
HBA1C MFR BLD: 8 % (ref 0–5.6)
HCO3 SERPL-SCNC: 27 MMOL/L (ref 22–29)
HDLC SERPL-MCNC: 40 MG/DL
LDLC SERPL CALC-MCNC: 78 MG/DL
MICROALBUMIN UR-MCNC: <12 MG/L
MICROALBUMIN/CREAT UR: NORMAL MG/G{CREAT}
NONHDLC SERPL-MCNC: 104 MG/DL
POTASSIUM SERPL-SCNC: 4.2 MMOL/L (ref 3.4–5.3)
PSA SERPL DL<=0.01 NG/ML-MCNC: 1.96 NG/ML (ref 0–4.5)
SODIUM SERPL-SCNC: 137 MMOL/L (ref 135–145)
TRIGL SERPL-MCNC: 129 MG/DL

## 2025-06-02 ENCOUNTER — PATIENT OUTREACH (OUTPATIENT)
Dept: CARE COORDINATION | Facility: CLINIC | Age: 60
End: 2025-06-02
Payer: COMMERCIAL

## 2025-06-27 DIAGNOSIS — J45.30 MILD PERSISTENT ASTHMA WITHOUT COMPLICATION: ICD-10-CM

## 2025-06-27 NOTE — TELEPHONE ENCOUNTER
Requested Prescriptions   Pending Prescriptions Disp Refills    DULERA 200-5 MCG/ACT inhaler [Pharmacy Med Name: DULERA 200-5MCG/ACT AERO] 36 g 3     Sig: INHALE TWO PUFFS BY MOUTH TWICE A DAY       Inhaled Steroids Protocol Failed - 6/27/2025  4:01 PM        Failed - Asthma control assessment score within normal limits in last 6 months     Please review ACT score.       5/6/2024 9/8/2024 5/22/2025   Asthma Control Test   ACT Total Score (Goal Greater than or Equal to 20) 17 18 19    14        Patient-reported    Multiple values from one day are sorted in reverse-chronological order             Passed - Patient is age 12 or older        Passed - Medication is active on med list and the sig matches. RN to manually verify dose and sig if red X/fail.     If the protocol passes (green check), you do not need to verify med dose and sig.    A prescription matches if they are the same clinical intention.    For Example: once daily and every morning are the same.    The protocol can not identify upper and lower case letters as matching and will fail.     For Example: Take 1 tablet (50 mg) by mouth daily     TAKE 1 TABLET (50 MG) BY MOUTH DAILY    For all fails (red x), verify dose and sig.    If the refill does match what is on file, the RN can still proceed to approve the refill request.       If they do not match, route to the appropriate provider.             Passed - Recent (6 month) or future (90 days) visit with the authorizing provider's specialty (provided they have been seen in the past 9 months)     The patient must have completed an in-person or virtual visit within the past 6 months or has a future visit scheduled within the next 90 days with the authorizing provider s specialty.  Urgent care and e-visits do not quality as an office visit for this protocol.          Passed - Medication indicated for associated diagnosis     Medication is associated with one or more of the following diagnoses:     Allergies   Asthma   COPD   Nasal Congestion   Nasal Polyps   Rhinitis   Cystic Fibrosis   Bronchiectasis

## 2025-06-30 RX ORDER — MOMETASONE FUROATE AND FORMOTEROL FUMARATE DIHYDRATE 200; 5 UG/1; UG/1
2 AEROSOL RESPIRATORY (INHALATION) 2 TIMES DAILY
Qty: 36 G | Refills: 3 | Status: SHIPPED | OUTPATIENT
Start: 2025-06-30